# Patient Record
Sex: FEMALE | Race: WHITE | URBAN - METROPOLITAN AREA
[De-identification: names, ages, dates, MRNs, and addresses within clinical notes are randomized per-mention and may not be internally consistent; named-entity substitution may affect disease eponyms.]

---

## 2020-06-01 ENCOUNTER — TELEPHONE ENCOUNTER (OUTPATIENT)
Dept: URBAN - METROPOLITAN AREA CLINIC 78 | Facility: CLINIC | Age: 40
End: 2020-06-01

## 2020-06-01 RX ORDER — MESALAMINE 1.2 G/1
2 TABLETS TABLET, DELAYED RELEASE ORAL ONCE A DAY
Qty: 180 TABLET | Refills: 3 | OUTPATIENT

## 2020-06-16 ENCOUNTER — WEB ENCOUNTER (OUTPATIENT)
Dept: URBAN - METROPOLITAN AREA CLINIC 78 | Facility: CLINIC | Age: 40
End: 2020-06-16

## 2020-06-18 ENCOUNTER — TELEPHONE ENCOUNTER (OUTPATIENT)
Dept: URBAN - METROPOLITAN AREA CLINIC 92 | Facility: CLINIC | Age: 40
End: 2020-06-18

## 2020-06-22 ENCOUNTER — TELEPHONE ENCOUNTER (OUTPATIENT)
Dept: URBAN - METROPOLITAN AREA CLINIC 78 | Facility: CLINIC | Age: 40
End: 2020-06-22

## 2020-06-23 ENCOUNTER — WEB ENCOUNTER (OUTPATIENT)
Dept: URBAN - METROPOLITAN AREA CLINIC 78 | Facility: CLINIC | Age: 40
End: 2020-06-23

## 2020-06-29 ENCOUNTER — WEB ENCOUNTER (OUTPATIENT)
Dept: URBAN - METROPOLITAN AREA CLINIC 78 | Facility: CLINIC | Age: 40
End: 2020-06-29

## 2020-06-29 RX ORDER — MESALAMINE 1.2 G/1
TAKE 4 TABLETS BY MOUTH ONCE DAILY WITH A MEAL TABLET, DELAYED RELEASE ORAL ONCE A DAY
Qty: 360 TABLET | Refills: 3 | OUTPATIENT

## 2020-07-07 ENCOUNTER — CLAIMS CREATED FROM THE CLAIM WINDOW (OUTPATIENT)
Dept: URBAN - METROPOLITAN AREA CLINIC 77 | Facility: CLINIC | Age: 40
End: 2020-07-07
Payer: COMMERCIAL

## 2020-07-07 ENCOUNTER — OFFICE VISIT (OUTPATIENT)
Dept: URBAN - METROPOLITAN AREA CLINIC 77 | Facility: CLINIC | Age: 40
End: 2020-07-07

## 2020-07-07 DIAGNOSIS — K51.80 CHRONIC PANCOLONIC ULCERATIVE COLITIS: ICD-10-CM

## 2020-07-07 PROCEDURE — 96413 CHEMO IV INFUSION 1 HR: CPT | Performed by: INTERNAL MEDICINE

## 2020-07-07 RX ORDER — MESALAMINE 1.2 G/1
2 TABLETS TABLET, DELAYED RELEASE ORAL ONCE A DAY
Qty: 180 TABLET | Refills: 3 | Status: ACTIVE | COMMUNITY

## 2020-07-07 RX ORDER — ESCITALOPRAM OXALATE 5 MG
TAKE 1 TABLET (5 MG) BY ORAL ROUTE ONCE DAILY TABLET ORAL 1
Qty: 0 | Refills: 0 | Status: ACTIVE | COMMUNITY
Start: 1900-01-01 | End: 1900-01-01

## 2020-07-07 RX ORDER — VEDOLIZUMAB 300 MG/5ML
INFUSE 300MG INTRAVENOUSLY OVER 30 MINUTES EVERY 8 WEEKS INJECTION, POWDER, LYOPHILIZED, FOR SOLUTION INTRAVENOUS
Qty: 1 | Refills: 2 | Status: ACTIVE | COMMUNITY
Start: 2019-07-29 | End: 1900-01-01

## 2020-07-07 RX ORDER — MESALAMINE 1.2 G/1
TAKE 4 TABLETS BY MOUTH ONCE DAILY WITH A MEAL TABLET, DELAYED RELEASE ORAL ONCE A DAY
Qty: 360 TABLET | Refills: 3 | Status: ACTIVE | COMMUNITY

## 2020-07-07 RX ORDER — MESALAMINE 4 G/60ML
INSERT 60 MILLILITERS (4 GRAM) BY RECTAL ROUTE ONCE DAILY AT BEDTIME FOR 3 WEEKS ENEMA RECTAL 1
Qty: 21 | Refills: 1 | Status: ACTIVE | COMMUNITY
Start: 1900-01-01 | End: 1900-01-01

## 2020-08-17 ENCOUNTER — TELEPHONE ENCOUNTER (OUTPATIENT)
Dept: URBAN - METROPOLITAN AREA CLINIC 77 | Facility: CLINIC | Age: 40
End: 2020-08-17

## 2020-08-17 RX ORDER — VEDOLIZUMAB 300 MG/5ML
INFUSE 300MG INTRAVENOUSLY OVER 30 MINUTES EVERY 8 WEEKS INJECTION, POWDER, LYOPHILIZED, FOR SOLUTION INTRAVENOUS ONCE A DAY
Qty: 10 | Refills: 1 | OUTPATIENT

## 2020-09-01 ENCOUNTER — OFFICE VISIT (OUTPATIENT)
Dept: URBAN - METROPOLITAN AREA CLINIC 77 | Facility: CLINIC | Age: 40
End: 2020-09-01

## 2020-09-09 ENCOUNTER — OFFICE VISIT (OUTPATIENT)
Dept: URBAN - METROPOLITAN AREA CLINIC 114 | Facility: CLINIC | Age: 40
End: 2020-09-09

## 2020-09-09 ENCOUNTER — CLAIMS CREATED FROM THE CLAIM WINDOW (OUTPATIENT)
Dept: URBAN - METROPOLITAN AREA CLINIC 114 | Facility: CLINIC | Age: 40
End: 2020-09-09
Payer: COMMERCIAL

## 2020-09-09 DIAGNOSIS — K51.80 CHRONIC PANCOLONIC ULCERATIVE COLITIS: ICD-10-CM

## 2020-09-09 PROCEDURE — 96413 CHEMO IV INFUSION 1 HR: CPT | Performed by: INTERNAL MEDICINE

## 2020-09-09 RX ORDER — MESALAMINE 1.2 G/1
2 TABLETS TABLET, DELAYED RELEASE ORAL ONCE A DAY
Qty: 180 TABLET | Refills: 3 | Status: ACTIVE | COMMUNITY

## 2020-09-09 RX ORDER — VEDOLIZUMAB 300 MG/5ML
INFUSE 300MG INTRAVENOUSLY OVER 30 MINUTES EVERY 8 WEEKS INJECTION, POWDER, LYOPHILIZED, FOR SOLUTION INTRAVENOUS ONCE A DAY
Qty: 10 | Refills: 1 | Status: ACTIVE | COMMUNITY

## 2020-09-09 RX ORDER — ESCITALOPRAM OXALATE 5 MG
TAKE 1 TABLET (5 MG) BY ORAL ROUTE ONCE DAILY TABLET ORAL 1
Qty: 0 | Refills: 0 | Status: ACTIVE | COMMUNITY
Start: 1900-01-01 | End: 1900-01-01

## 2020-09-09 RX ORDER — MESALAMINE 4 G/60ML
INSERT 60 MILLILITERS (4 GRAM) BY RECTAL ROUTE ONCE DAILY AT BEDTIME FOR 3 WEEKS ENEMA RECTAL 1
Qty: 21 | Refills: 1 | Status: ACTIVE | COMMUNITY
Start: 1900-01-01 | End: 1900-01-01

## 2020-09-09 RX ORDER — MESALAMINE 1.2 G/1
TAKE 4 TABLETS BY MOUTH ONCE DAILY WITH A MEAL TABLET, DELAYED RELEASE ORAL ONCE A DAY
Qty: 360 TABLET | Refills: 3 | Status: ACTIVE | COMMUNITY

## 2020-10-30 ENCOUNTER — TELEPHONE ENCOUNTER (OUTPATIENT)
Dept: URBAN - METROPOLITAN AREA CLINIC 92 | Facility: CLINIC | Age: 40
End: 2020-10-30

## 2020-11-04 ENCOUNTER — OFFICE VISIT (OUTPATIENT)
Dept: URBAN - METROPOLITAN AREA CLINIC 114 | Facility: CLINIC | Age: 40
End: 2020-11-04
Payer: COMMERCIAL

## 2020-11-04 DIAGNOSIS — K51.80 CHRONIC PANCOLONIC ULCERATIVE COLITIS: ICD-10-CM

## 2020-11-04 PROCEDURE — 96413 CHEMO IV INFUSION 1 HR: CPT | Performed by: INTERNAL MEDICINE

## 2020-11-04 RX ORDER — VEDOLIZUMAB 300 MG/5ML
INFUSE 300MG INTRAVENOUSLY OVER 30 MINUTES EVERY 8 WEEKS INJECTION, POWDER, LYOPHILIZED, FOR SOLUTION INTRAVENOUS ONCE A DAY
Qty: 10 | Refills: 1 | Status: ACTIVE | COMMUNITY

## 2020-11-04 RX ORDER — MESALAMINE 1.2 G/1
TAKE 4 TABLETS BY MOUTH ONCE DAILY WITH A MEAL TABLET, DELAYED RELEASE ORAL ONCE A DAY
Qty: 360 TABLET | Refills: 3 | Status: ACTIVE | COMMUNITY

## 2020-11-04 RX ORDER — MESALAMINE 1.2 G/1
2 TABLETS TABLET, DELAYED RELEASE ORAL ONCE A DAY
Qty: 180 TABLET | Refills: 3 | Status: ACTIVE | COMMUNITY

## 2020-11-04 RX ORDER — MESALAMINE 4 G/60ML
INSERT 60 MILLILITERS (4 GRAM) BY RECTAL ROUTE ONCE DAILY AT BEDTIME FOR 3 WEEKS ENEMA RECTAL 1
Qty: 21 | Refills: 1 | Status: ACTIVE | COMMUNITY
Start: 1900-01-01 | End: 1900-01-01

## 2020-11-04 RX ORDER — ESCITALOPRAM OXALATE 5 MG
TAKE 1 TABLET (5 MG) BY ORAL ROUTE ONCE DAILY TABLET ORAL 1
Qty: 0 | Refills: 0 | Status: ACTIVE | COMMUNITY
Start: 1900-01-01 | End: 1900-01-01

## 2020-11-05 ENCOUNTER — TELEPHONE ENCOUNTER (OUTPATIENT)
Dept: URBAN - METROPOLITAN AREA CLINIC 92 | Facility: CLINIC | Age: 40
End: 2020-11-05

## 2020-11-05 RX ORDER — VEDOLIZUMAB 300 MG/5ML
INFUSE 300MG INTRAVENOUSLY OVER 30 MINUTES EVERY 8 WEEKS INJECTION, POWDER, LYOPHILIZED, FOR SOLUTION INTRAVENOUS ONCE A DAY
Qty: 10 | Refills: 1 | Status: ACTIVE | COMMUNITY

## 2020-11-05 RX ORDER — ESCITALOPRAM OXALATE 5 MG
TAKE 1 TABLET (5 MG) BY ORAL ROUTE ONCE DAILY TABLET ORAL 1
Qty: 0 | Refills: 0 | Status: ACTIVE | COMMUNITY
Start: 1900-01-01 | End: 1900-01-01

## 2020-11-05 RX ORDER — MESALAMINE 4 G/60ML
INSERT 60 MILLILITERS (4 GRAM) BY RECTAL ROUTE ONCE DAILY AT BEDTIME FOR 3 WEEKS ENEMA RECTAL 1
Qty: 21 | Refills: 1 | Status: ACTIVE | COMMUNITY
Start: 1900-01-01 | End: 1900-01-01

## 2020-11-05 RX ORDER — SODIUM PICOSULFATE, MAGNESIUM OXIDE, AND ANHYDROUS CITRIC ACID 10; 3.5; 12 MG/160ML; G/160ML; G/160ML
160 ML DAY #1 AND 160 ML DAY # 2 LIQUID ORAL ONCE A DAY
Qty: 320 MILLIMETER | Refills: 0 | OUTPATIENT
Start: 2020-11-11

## 2020-11-05 RX ORDER — MESALAMINE 1.2 G/1
2 TABLETS TABLET, DELAYED RELEASE ORAL ONCE A DAY
Qty: 180 TABLET | Refills: 3 | Status: ACTIVE | COMMUNITY

## 2020-11-05 RX ORDER — MESALAMINE 1.2 G/1
TAKE 4 TABLETS BY MOUTH ONCE DAILY WITH A MEAL TABLET, DELAYED RELEASE ORAL ONCE A DAY
Qty: 360 TABLET | Refills: 3 | Status: ACTIVE | COMMUNITY

## 2020-11-20 ENCOUNTER — CLAIMS CREATED FROM THE CLAIM WINDOW (OUTPATIENT)
Dept: URBAN - METROPOLITAN AREA CLINIC 4 | Facility: CLINIC | Age: 40
End: 2020-11-20
Payer: COMMERCIAL

## 2020-11-20 ENCOUNTER — OFFICE VISIT (OUTPATIENT)
Dept: URBAN - METROPOLITAN AREA SURGERY CENTER 15 | Facility: SURGERY CENTER | Age: 40
End: 2020-11-20
Payer: COMMERCIAL

## 2020-11-20 DIAGNOSIS — D12.2 ADENOMA OF ASCENDING COLON: ICD-10-CM

## 2020-11-20 DIAGNOSIS — K51.50 CHRONIC LEFT-SIDED ULCERATIVE COLITIS: ICD-10-CM

## 2020-11-20 DIAGNOSIS — D12.2 BENIGN NEOPLASM OF ASCENDING COLON: ICD-10-CM

## 2020-11-20 DIAGNOSIS — K63.89 OTHER SPECIFIED DISEASES OF INTESTINE: ICD-10-CM

## 2020-11-20 PROCEDURE — 45385 COLONOSCOPY W/LESION REMOVAL: CPT | Performed by: INTERNAL MEDICINE

## 2020-11-20 PROCEDURE — 88305 TISSUE EXAM BY PATHOLOGIST: CPT | Performed by: PATHOLOGY

## 2020-11-20 PROCEDURE — 45380 COLONOSCOPY AND BIOPSY: CPT | Performed by: INTERNAL MEDICINE

## 2020-11-20 PROCEDURE — G9937 DIG OR SURV COLSCO: HCPCS | Performed by: INTERNAL MEDICINE

## 2020-11-20 PROCEDURE — G8907 PT DOC NO EVENTS ON DISCHARG: HCPCS | Performed by: INTERNAL MEDICINE

## 2020-11-25 ENCOUNTER — OFFICE VISIT (OUTPATIENT)
Dept: URBAN - METROPOLITAN AREA MEDICAL CENTER 10 | Facility: MEDICAL CENTER | Age: 40
End: 2020-11-25

## 2020-12-08 ENCOUNTER — WEB ENCOUNTER (OUTPATIENT)
Dept: URBAN - METROPOLITAN AREA CLINIC 78 | Facility: CLINIC | Age: 40
End: 2020-12-08

## 2021-01-05 ENCOUNTER — OFFICE VISIT (OUTPATIENT)
Dept: URBAN - METROPOLITAN AREA CLINIC 77 | Facility: CLINIC | Age: 41
End: 2021-01-05
Payer: COMMERCIAL

## 2021-01-05 DIAGNOSIS — K51.80 CHRONIC PANCOLONIC ULCERATIVE COLITIS: ICD-10-CM

## 2021-01-05 PROCEDURE — 96365 THER/PROPH/DIAG IV INF INIT: CPT | Performed by: INTERNAL MEDICINE

## 2021-01-05 RX ORDER — ESCITALOPRAM OXALATE 5 MG
TAKE 1 TABLET (5 MG) BY ORAL ROUTE ONCE DAILY TABLET ORAL 1
Qty: 0 | Refills: 0 | Status: ACTIVE | COMMUNITY
Start: 1900-01-01 | End: 1900-01-01

## 2021-01-05 RX ORDER — SODIUM PICOSULFATE, MAGNESIUM OXIDE, AND ANHYDROUS CITRIC ACID 10; 3.5; 12 MG/160ML; G/160ML; G/160ML
160 ML DAY #1 AND 160 ML DAY # 2 LIQUID ORAL ONCE A DAY
Qty: 320 MILLIMETER | Refills: 0 | Status: ACTIVE | COMMUNITY
Start: 2020-11-11

## 2021-01-05 RX ORDER — MESALAMINE 1.2 G/1
2 TABLETS TABLET, DELAYED RELEASE ORAL ONCE A DAY
Qty: 180 TABLET | Refills: 3 | Status: ACTIVE | COMMUNITY

## 2021-01-05 RX ORDER — MESALAMINE 1.2 G/1
TAKE 4 TABLETS BY MOUTH ONCE DAILY WITH A MEAL TABLET, DELAYED RELEASE ORAL ONCE A DAY
Qty: 360 TABLET | Refills: 3 | Status: ACTIVE | COMMUNITY

## 2021-01-05 RX ORDER — MESALAMINE 4 G/60ML
INSERT 60 MILLILITERS (4 GRAM) BY RECTAL ROUTE ONCE DAILY AT BEDTIME FOR 3 WEEKS ENEMA RECTAL 1
Qty: 21 | Refills: 1 | Status: ACTIVE | COMMUNITY
Start: 1900-01-01 | End: 1900-01-01

## 2021-01-05 RX ORDER — VEDOLIZUMAB 300 MG/5ML
INFUSE 300MG INTRAVENOUSLY OVER 30 MINUTES EVERY 8 WEEKS INJECTION, POWDER, LYOPHILIZED, FOR SOLUTION INTRAVENOUS ONCE A DAY
Qty: 10 | Refills: 1 | Status: ACTIVE | COMMUNITY

## 2021-01-07 ENCOUNTER — WEB ENCOUNTER (OUTPATIENT)
Dept: URBAN - METROPOLITAN AREA CLINIC 78 | Facility: CLINIC | Age: 41
End: 2021-01-07

## 2021-01-07 RX ORDER — MESALAMINE 1.2 G/1
TAKE 4 TABLETS BY MOUTH ONCE DAILY WITH A MEAL TABLET, DELAYED RELEASE ORAL ONCE A DAY
Qty: 360 TABLET | Refills: 3

## 2021-01-08 ENCOUNTER — WEB ENCOUNTER (OUTPATIENT)
Dept: URBAN - METROPOLITAN AREA CLINIC 78 | Facility: CLINIC | Age: 41
End: 2021-01-08

## 2021-01-11 ENCOUNTER — TELEPHONE ENCOUNTER (OUTPATIENT)
Dept: URBAN - METROPOLITAN AREA CLINIC 78 | Facility: CLINIC | Age: 41
End: 2021-01-11

## 2021-01-11 RX ORDER — MESALAMINE 4 G/60ML
60 ML AT BEDTIME ENEMA RECTAL ONCE A DAY
Qty: 5400 ML | Refills: 0

## 2021-01-15 ENCOUNTER — WEB ENCOUNTER (OUTPATIENT)
Dept: URBAN - METROPOLITAN AREA CLINIC 78 | Facility: CLINIC | Age: 41
End: 2021-01-15

## 2021-03-02 ENCOUNTER — OFFICE VISIT (OUTPATIENT)
Dept: URBAN - METROPOLITAN AREA CLINIC 77 | Facility: CLINIC | Age: 41
End: 2021-03-02
Payer: COMMERCIAL

## 2021-03-02 DIAGNOSIS — K51.80 CHRONIC PANCOLONIC ULCERATIVE COLITIS: ICD-10-CM

## 2021-03-02 PROCEDURE — 96365 THER/PROPH/DIAG IV INF INIT: CPT | Performed by: INTERNAL MEDICINE

## 2021-03-02 RX ORDER — MESALAMINE 1.2 G/1
TAKE 4 TABLETS BY MOUTH ONCE DAILY WITH A MEAL TABLET, DELAYED RELEASE ORAL ONCE A DAY
Qty: 360 TABLET | Refills: 3 | Status: ACTIVE | COMMUNITY

## 2021-03-02 RX ORDER — VEDOLIZUMAB 300 MG/5ML
INFUSE 300MG INTRAVENOUSLY OVER 30 MINUTES EVERY 8 WEEKS INJECTION, POWDER, LYOPHILIZED, FOR SOLUTION INTRAVENOUS ONCE A DAY
Qty: 10 | Refills: 1 | Status: ACTIVE | COMMUNITY

## 2021-03-02 RX ORDER — ESCITALOPRAM OXALATE 5 MG
TAKE 1 TABLET (5 MG) BY ORAL ROUTE ONCE DAILY TABLET ORAL 1
Qty: 0 | Refills: 0 | Status: ACTIVE | COMMUNITY
Start: 1900-01-01 | End: 1900-01-01

## 2021-03-02 RX ORDER — SODIUM PICOSULFATE, MAGNESIUM OXIDE, AND ANHYDROUS CITRIC ACID 10; 3.5; 12 MG/160ML; G/160ML; G/160ML
160 ML DAY #1 AND 160 ML DAY # 2 LIQUID ORAL ONCE A DAY
Qty: 320 MILLIMETER | Refills: 0 | Status: ACTIVE | COMMUNITY
Start: 2020-11-11

## 2021-03-02 RX ORDER — MESALAMINE 1.2 G/1
2 TABLETS TABLET, DELAYED RELEASE ORAL ONCE A DAY
Qty: 180 TABLET | Refills: 3 | Status: ACTIVE | COMMUNITY

## 2021-03-02 RX ORDER — MESALAMINE 4 G/60ML
60 ML AT BEDTIME ENEMA RECTAL ONCE A DAY
Qty: 5400 ML | Refills: 0 | Status: ACTIVE | COMMUNITY

## 2021-03-22 ENCOUNTER — OFFICE VISIT (OUTPATIENT)
Dept: URBAN - METROPOLITAN AREA CLINIC 78 | Facility: CLINIC | Age: 41
End: 2021-03-22
Payer: COMMERCIAL

## 2021-03-22 VITALS
WEIGHT: 199.4 LBS | HEIGHT: 67 IN | SYSTOLIC BLOOD PRESSURE: 114 MMHG | HEART RATE: 89 BPM | TEMPERATURE: 97.6 F | BODY MASS INDEX: 31.3 KG/M2 | RESPIRATION RATE: 16 BRPM | DIASTOLIC BLOOD PRESSURE: 76 MMHG

## 2021-03-22 DIAGNOSIS — D12.2 BENIGN NEOPLASM OF ASCENDING COLON: ICD-10-CM

## 2021-03-22 DIAGNOSIS — K51.90 ULCERATIVE COLITIS: ICD-10-CM

## 2021-03-22 PROCEDURE — 99213 OFFICE O/P EST LOW 20 MIN: CPT | Performed by: INTERNAL MEDICINE

## 2021-03-22 RX ORDER — VEDOLIZUMAB 300 MG/5ML
INFUSE 300MG INTRAVENOUSLY OVER 30 MINUTES EVERY 8 WEEKS INJECTION, POWDER, LYOPHILIZED, FOR SOLUTION INTRAVENOUS ONCE A DAY
Qty: 10 | Refills: 1 | Status: ACTIVE | COMMUNITY

## 2021-03-22 RX ORDER — MESALAMINE 4 G/60ML
60 ML AT BEDTIME ENEMA RECTAL ONCE A DAY
Qty: 5400 ML | Refills: 0 | Status: ACTIVE | COMMUNITY

## 2021-03-22 RX ORDER — SODIUM PICOSULFATE, MAGNESIUM OXIDE, AND ANHYDROUS CITRIC ACID 10; 3.5; 12 MG/160ML; G/160ML; G/160ML
160 ML DAY #1 AND 160 ML DAY # 2 LIQUID ORAL ONCE A DAY
Qty: 320 MILLIMETER | Refills: 0 | Status: ON HOLD | COMMUNITY
Start: 2020-11-11

## 2021-03-22 RX ORDER — MESALAMINE 1.2 G/1
TAKE 4 TABLETS BY MOUTH ONCE DAILY WITH A MEAL TABLET, DELAYED RELEASE ORAL ONCE A DAY
Qty: 360 TABLET | Refills: 3 | Status: ACTIVE | COMMUNITY

## 2021-03-22 RX ORDER — MESALAMINE 1.2 G/1
TAKE 4 TABLETS BY MOUTH ONCE DAILY WITH A MEAL TABLET, DELAYED RELEASE ORAL ONCE A DAY
OUTPATIENT

## 2021-03-22 RX ORDER — ESCITALOPRAM OXALATE 5 MG
TAKE 1 TABLET (5 MG) BY ORAL ROUTE ONCE DAILY TABLET ORAL 1
Qty: 0 | Refills: 0 | Status: ACTIVE | COMMUNITY
Start: 1900-01-01

## 2021-03-22 RX ORDER — MESALAMINE 1.2 G/1
2 TABLETS TABLET, DELAYED RELEASE ORAL ONCE A DAY
Qty: 180 TABLET | Refills: 3 | Status: ACTIVE | COMMUNITY

## 2021-03-22 RX ORDER — VEDOLIZUMAB 300 MG/5ML
INFUSE 300MG INTRAVENOUSLY OVER 30 MINUTES EVERY 8 WEEKS INJECTION, POWDER, LYOPHILIZED, FOR SOLUTION INTRAVENOUS ONCE A DAY
OUTPATIENT

## 2021-03-22 NOTE — HPI-TODAY'S VISIT:
Patient is doing well  Personal hx of Proctosigmoiditis  Currently on Entyvio  , last infusion March 2 2021  Mesalamine 4.8 gm /day [facility] : BM 1 a day formed or loose  No blood in stool  No mucus   Occasional gas pressure   Hep B s Ag neg ( immune )   Last Blood with PCP :   Last colonoscopy 11/5/2020     DATA : Does not tolerate oral Uceris or Prednisone ( hyperanxiety)

## 2021-03-30 ENCOUNTER — TELEPHONE ENCOUNTER (OUTPATIENT)
Dept: URBAN - METROPOLITAN AREA CLINIC 78 | Facility: CLINIC | Age: 41
End: 2021-03-30

## 2021-04-22 ENCOUNTER — TELEPHONE ENCOUNTER (OUTPATIENT)
Dept: URBAN - METROPOLITAN AREA CLINIC 78 | Facility: CLINIC | Age: 41
End: 2021-04-22

## 2021-04-23 ENCOUNTER — TELEPHONE ENCOUNTER (OUTPATIENT)
Dept: URBAN - METROPOLITAN AREA CLINIC 78 | Facility: CLINIC | Age: 41
End: 2021-04-23

## 2021-04-26 ENCOUNTER — OFFICE VISIT (OUTPATIENT)
Dept: URBAN - METROPOLITAN AREA CLINIC 77 | Facility: CLINIC | Age: 41
End: 2021-04-26

## 2021-04-26 RX ORDER — ESCITALOPRAM OXALATE 5 MG
TAKE 1 TABLET (5 MG) BY ORAL ROUTE ONCE DAILY TABLET ORAL 1
Qty: 0 | Refills: 0 | Status: ACTIVE | COMMUNITY
Start: 1900-01-01

## 2021-04-26 RX ORDER — VEDOLIZUMAB 300 MG/5ML
INFUSE 300MG INTRAVENOUSLY OVER 30 MINUTES EVERY 8 WEEKS INJECTION, POWDER, LYOPHILIZED, FOR SOLUTION INTRAVENOUS ONCE A DAY
Status: ACTIVE | COMMUNITY

## 2021-04-26 RX ORDER — MESALAMINE 1.2 G/1
TAKE 4 TABLETS BY MOUTH ONCE DAILY WITH A MEAL TABLET, DELAYED RELEASE ORAL ONCE A DAY
Status: ACTIVE | COMMUNITY

## 2021-04-26 RX ORDER — MESALAMINE 1.2 G/1
2 TABLETS TABLET, DELAYED RELEASE ORAL ONCE A DAY
Qty: 180 TABLET | Refills: 3 | Status: ACTIVE | COMMUNITY

## 2021-04-26 RX ORDER — MESALAMINE 4 G/60ML
60 ML AT BEDTIME ENEMA RECTAL ONCE A DAY
Qty: 5400 ML | Refills: 0 | Status: ACTIVE | COMMUNITY

## 2021-04-26 RX ORDER — SODIUM PICOSULFATE, MAGNESIUM OXIDE, AND ANHYDROUS CITRIC ACID 10; 3.5; 12 MG/160ML; G/160ML; G/160ML
160 ML DAY #1 AND 160 ML DAY # 2 LIQUID ORAL ONCE A DAY
Qty: 320 MILLIMETER | Refills: 0 | Status: ON HOLD | COMMUNITY
Start: 2020-11-11

## 2021-05-04 ENCOUNTER — OFFICE VISIT (OUTPATIENT)
Dept: URBAN - METROPOLITAN AREA CLINIC 77 | Facility: CLINIC | Age: 41
End: 2021-05-04
Payer: COMMERCIAL

## 2021-05-04 VITALS
RESPIRATION RATE: 18 BRPM | HEIGHT: 67 IN | BODY MASS INDEX: 31.39 KG/M2 | TEMPERATURE: 97.8 F | WEIGHT: 200 LBS | DIASTOLIC BLOOD PRESSURE: 61 MMHG | HEART RATE: 81 BPM | SYSTOLIC BLOOD PRESSURE: 97 MMHG

## 2021-05-04 DIAGNOSIS — K51.80 CHRONIC PANCOLONIC ULCERATIVE COLITIS: ICD-10-CM

## 2021-05-04 PROCEDURE — 96365 THER/PROPH/DIAG IV INF INIT: CPT | Performed by: INTERNAL MEDICINE

## 2021-05-04 RX ORDER — MESALAMINE 1.2 G/1
2 TABLETS TABLET, DELAYED RELEASE ORAL ONCE A DAY
Qty: 180 TABLET | Refills: 3 | Status: ACTIVE | COMMUNITY

## 2021-05-04 RX ORDER — SODIUM PICOSULFATE, MAGNESIUM OXIDE, AND ANHYDROUS CITRIC ACID 10; 3.5; 12 MG/160ML; G/160ML; G/160ML
160 ML DAY #1 AND 160 ML DAY # 2 LIQUID ORAL ONCE A DAY
Qty: 320 MILLIMETER | Refills: 0 | Status: ON HOLD | COMMUNITY
Start: 2020-11-11

## 2021-05-04 RX ORDER — MESALAMINE 4 G/60ML
60 ML AT BEDTIME ENEMA RECTAL ONCE A DAY
Qty: 5400 ML | Refills: 0 | Status: ACTIVE | COMMUNITY

## 2021-05-04 RX ORDER — ESCITALOPRAM OXALATE 5 MG
TAKE 1 TABLET (5 MG) BY ORAL ROUTE ONCE DAILY TABLET ORAL 1
Qty: 0 | Refills: 0 | Status: ACTIVE | COMMUNITY
Start: 1900-01-01

## 2021-05-04 RX ORDER — MESALAMINE 1.2 G/1
TAKE 4 TABLETS BY MOUTH ONCE DAILY WITH A MEAL TABLET, DELAYED RELEASE ORAL ONCE A DAY
Status: ACTIVE | COMMUNITY

## 2021-05-04 RX ORDER — VEDOLIZUMAB 300 MG/5ML
INFUSE 300MG INTRAVENOUSLY OVER 30 MINUTES EVERY 8 WEEKS INJECTION, POWDER, LYOPHILIZED, FOR SOLUTION INTRAVENOUS ONCE A DAY
Status: ACTIVE | COMMUNITY

## 2021-07-19 ENCOUNTER — OFFICE VISIT (OUTPATIENT)
Dept: URBAN - METROPOLITAN AREA CLINIC 77 | Facility: CLINIC | Age: 41
End: 2021-07-19
Payer: COMMERCIAL

## 2021-07-19 DIAGNOSIS — K51.80 CHRONIC PANCOLONIC ULCERATIVE COLITIS: ICD-10-CM

## 2021-07-19 PROCEDURE — 96365 THER/PROPH/DIAG IV INF INIT: CPT | Performed by: INTERNAL MEDICINE

## 2021-07-19 RX ORDER — MESALAMINE 4 G/60ML
60 ML AT BEDTIME ENEMA RECTAL ONCE A DAY
Qty: 5400 ML | Refills: 0 | Status: ACTIVE | COMMUNITY

## 2021-07-19 RX ORDER — MESALAMINE 1.2 G/1
2 TABLETS TABLET, DELAYED RELEASE ORAL ONCE A DAY
Qty: 180 TABLET | Refills: 3 | Status: ACTIVE | COMMUNITY

## 2021-07-19 RX ORDER — MESALAMINE 1.2 G/1
TAKE 4 TABLETS BY MOUTH ONCE DAILY WITH A MEAL TABLET, DELAYED RELEASE ORAL ONCE A DAY
Status: ACTIVE | COMMUNITY

## 2021-07-19 RX ORDER — VEDOLIZUMAB 300 MG/5ML
INFUSE 300MG INTRAVENOUSLY OVER 30 MINUTES EVERY 8 WEEKS INJECTION, POWDER, LYOPHILIZED, FOR SOLUTION INTRAVENOUS ONCE A DAY
Status: ACTIVE | COMMUNITY

## 2021-07-19 RX ORDER — SODIUM PICOSULFATE, MAGNESIUM OXIDE, AND ANHYDROUS CITRIC ACID 10; 3.5; 12 MG/160ML; G/160ML; G/160ML
160 ML DAY #1 AND 160 ML DAY # 2 LIQUID ORAL ONCE A DAY
Qty: 320 MILLIMETER | Refills: 0 | Status: ON HOLD | COMMUNITY
Start: 2020-11-11

## 2021-07-19 RX ORDER — ESCITALOPRAM OXALATE 5 MG
TAKE 1 TABLET (5 MG) BY ORAL ROUTE ONCE DAILY TABLET ORAL 1
Qty: 0 | Refills: 0 | Status: ACTIVE | COMMUNITY
Start: 1900-01-01

## 2021-09-07 ENCOUNTER — TELEPHONE ENCOUNTER (OUTPATIENT)
Dept: URBAN - METROPOLITAN AREA CLINIC 77 | Facility: CLINIC | Age: 41
End: 2021-09-07

## 2021-09-07 ENCOUNTER — OFFICE VISIT (OUTPATIENT)
Dept: URBAN - METROPOLITAN AREA CLINIC 77 | Facility: CLINIC | Age: 41
End: 2021-09-07
Payer: COMMERCIAL

## 2021-09-07 VITALS
TEMPERATURE: 98.4 F | HEART RATE: 88 BPM | SYSTOLIC BLOOD PRESSURE: 100 MMHG | RESPIRATION RATE: 18 BRPM | BODY MASS INDEX: 29.82 KG/M2 | WEIGHT: 190 LBS | DIASTOLIC BLOOD PRESSURE: 63 MMHG | HEIGHT: 67 IN

## 2021-09-07 DIAGNOSIS — K51.80 CHRONIC PANCOLONIC ULCERATIVE COLITIS: ICD-10-CM

## 2021-09-07 PROCEDURE — 96413 CHEMO IV INFUSION 1 HR: CPT | Performed by: INTERNAL MEDICINE

## 2021-09-07 RX ORDER — MESALAMINE 1.2 G/1
TAKE 4 TABLETS BY MOUTH ONCE DAILY WITH A MEAL TABLET, DELAYED RELEASE ORAL ONCE A DAY
Status: ACTIVE | COMMUNITY

## 2021-09-07 RX ORDER — VEDOLIZUMAB 300 MG/5ML
INFUSE 300MG INTRAVENOUSLY OVER 30 MINUTES EVERY 8 WEEKS INJECTION, POWDER, LYOPHILIZED, FOR SOLUTION INTRAVENOUS ONCE A DAY
Status: ACTIVE | COMMUNITY

## 2021-09-07 RX ORDER — MESALAMINE 1.2 G/1
2 TABLETS TABLET, DELAYED RELEASE ORAL ONCE A DAY
Qty: 180 TABLET | Refills: 3 | Status: ACTIVE | COMMUNITY

## 2021-09-07 RX ORDER — MESALAMINE 4 G/60ML
60 ML AT BEDTIME ENEMA RECTAL ONCE A DAY
Qty: 5400 ML | Refills: 0 | Status: ACTIVE | COMMUNITY

## 2021-09-07 RX ORDER — VEDOLIZUMAB 300 MG/5ML
INFUSE 300MG INTRAVENOUSLY OVER 30 MINUTES EVERY 8 WEEKS INJECTION, POWDER, LYOPHILIZED, FOR SOLUTION INTRAVENOUS ONCE A DAY
Qty: 2 VIAL | Refills: 4

## 2021-09-07 RX ORDER — SODIUM PICOSULFATE, MAGNESIUM OXIDE, AND ANHYDROUS CITRIC ACID 10; 3.5; 12 MG/160ML; G/160ML; G/160ML
160 ML DAY #1 AND 160 ML DAY # 2 LIQUID ORAL ONCE A DAY
Qty: 320 MILLIMETER | Refills: 0 | Status: ON HOLD | COMMUNITY
Start: 2020-11-11

## 2021-09-07 RX ORDER — ESCITALOPRAM OXALATE 5 MG
TAKE 1 TABLET (5 MG) BY ORAL ROUTE ONCE DAILY TABLET ORAL 1
Qty: 0 | Refills: 0 | Status: ACTIVE | COMMUNITY
Start: 1900-01-01

## 2021-09-29 LAB
A/G RATIO: 2
ALBUMIN: 4.3
ALKALINE PHOSPHATASE: 94
ALT (SGPT): 8
AST (SGOT): 12
BASO (ABSOLUTE): 0.1
BASOS: 1
BILIRUBIN, TOTAL: 0.7
BUN/CREATININE RATIO: 13
BUN: 9
CALCIUM: 8.9
CARBON DIOXIDE, TOTAL: 25
CHLORIDE: 102
CREATININE: 0.69
EGFR IF AFRICN AM: 126
EGFR IF NONAFRICN AM: 109
EOS (ABSOLUTE): 0.2
EOS: 3
GLOBULIN, TOTAL: 2.2
GLUCOSE: 87
HEMATOCRIT: 38.4
HEMATOLOGY COMMENTS:: (no result)
HEMOGLOBIN: 12.2
IMMATURE CELLS: (no result)
IMMATURE GRANS (ABS): 0
IMMATURE GRANULOCYTES: 0
LYMPHS (ABSOLUTE): 3.1
LYMPHS: 45
MCH: 30.2
MCHC: 31.8
MCV: 95
MONOCYTES(ABSOLUTE): 0.5
MONOCYTES: 7
NEUTROPHILS (ABSOLUTE): 3
NEUTROPHILS: 44
NRBC: (no result)
PLATELETS: 382
POTASSIUM: 4.4
PROTEIN, TOTAL: 6.5
QUANTIFERON CRITERIA: (no result)
QUANTIFERON INCUBATION: (no result)
QUANTIFERON MITOGEN VALUE: >10
QUANTIFERON NIL VALUE: 0.05
QUANTIFERON TB1 AG VALUE: 0.04
QUANTIFERON TB2 AG VALUE: 0.04
QUANTIFERON-TB GOLD PLUS: NEGATIVE
RBC: 4.04
RDW: 11.8
SODIUM: 139
WBC: 6.9

## 2021-10-08 ENCOUNTER — WEB ENCOUNTER (OUTPATIENT)
Dept: URBAN - METROPOLITAN AREA CLINIC 78 | Facility: CLINIC | Age: 41
End: 2021-10-08

## 2021-10-25 ENCOUNTER — OFFICE VISIT (OUTPATIENT)
Dept: URBAN - METROPOLITAN AREA CLINIC 77 | Facility: CLINIC | Age: 41
End: 2021-10-25
Payer: COMMERCIAL

## 2021-10-25 DIAGNOSIS — K51.80 CHRONIC PANCOLONIC ULCERATIVE COLITIS: ICD-10-CM

## 2021-10-25 PROCEDURE — 96413 CHEMO IV INFUSION 1 HR: CPT | Performed by: INTERNAL MEDICINE

## 2021-10-25 RX ORDER — MESALAMINE 1.2 G/1
2 TABLETS TABLET, DELAYED RELEASE ORAL ONCE A DAY
Qty: 180 TABLET | Refills: 3 | Status: ACTIVE | COMMUNITY

## 2021-10-25 RX ORDER — MESALAMINE 4 G/60ML
60 ML AT BEDTIME ENEMA RECTAL ONCE A DAY
Qty: 5400 ML | Refills: 0 | Status: ACTIVE | COMMUNITY

## 2021-10-25 RX ORDER — SODIUM PICOSULFATE, MAGNESIUM OXIDE, AND ANHYDROUS CITRIC ACID 10; 3.5; 12 MG/160ML; G/160ML; G/160ML
160 ML DAY #1 AND 160 ML DAY # 2 LIQUID ORAL ONCE A DAY
Qty: 320 MILLIMETER | Refills: 0 | Status: ON HOLD | COMMUNITY
Start: 2020-11-11

## 2021-10-25 RX ORDER — VEDOLIZUMAB 300 MG/5ML
INFUSE 300MG INTRAVENOUSLY OVER 30 MINUTES EVERY 8 WEEKS INJECTION, POWDER, LYOPHILIZED, FOR SOLUTION INTRAVENOUS ONCE A DAY
Status: ACTIVE | COMMUNITY

## 2021-10-25 RX ORDER — MESALAMINE 1.2 G/1
TAKE 4 TABLETS BY MOUTH ONCE DAILY WITH A MEAL TABLET, DELAYED RELEASE ORAL ONCE A DAY
Status: ACTIVE | COMMUNITY

## 2021-10-25 RX ORDER — ESCITALOPRAM OXALATE 5 MG
TAKE 1 TABLET (5 MG) BY ORAL ROUTE ONCE DAILY TABLET ORAL 1
Qty: 0 | Refills: 0 | Status: ACTIVE | COMMUNITY
Start: 1900-01-01

## 2021-10-28 ENCOUNTER — TELEPHONE ENCOUNTER (OUTPATIENT)
Dept: URBAN - METROPOLITAN AREA CLINIC 78 | Facility: CLINIC | Age: 41
End: 2021-10-28

## 2021-12-20 ENCOUNTER — OFFICE VISIT (OUTPATIENT)
Dept: URBAN - METROPOLITAN AREA CLINIC 77 | Facility: CLINIC | Age: 41
End: 2021-12-20
Payer: COMMERCIAL

## 2021-12-20 DIAGNOSIS — K51.80 CHRONIC PANCOLONIC ULCERATIVE COLITIS: ICD-10-CM

## 2021-12-20 PROCEDURE — 96413 CHEMO IV INFUSION 1 HR: CPT | Performed by: INTERNAL MEDICINE

## 2021-12-20 RX ORDER — MESALAMINE 4 G/60ML
60 ML AT BEDTIME ENEMA RECTAL ONCE A DAY
Qty: 5400 ML | Refills: 0 | Status: ACTIVE | COMMUNITY

## 2021-12-20 RX ORDER — SODIUM PICOSULFATE, MAGNESIUM OXIDE, AND ANHYDROUS CITRIC ACID 10; 3.5; 12 MG/160ML; G/160ML; G/160ML
160 ML DAY #1 AND 160 ML DAY # 2 LIQUID ORAL ONCE A DAY
Qty: 320 MILLIMETER | Refills: 0 | Status: ON HOLD | COMMUNITY
Start: 2020-11-11

## 2021-12-20 RX ORDER — VEDOLIZUMAB 300 MG/5ML
INFUSE 300MG INTRAVENOUSLY OVER 30 MINUTES EVERY 8 WEEKS INJECTION, POWDER, LYOPHILIZED, FOR SOLUTION INTRAVENOUS ONCE A DAY
Status: ACTIVE | COMMUNITY

## 2021-12-20 RX ORDER — MESALAMINE 1.2 G/1
2 TABLETS TABLET, DELAYED RELEASE ORAL ONCE A DAY
Qty: 180 TABLET | Refills: 3 | Status: ACTIVE | COMMUNITY

## 2021-12-20 RX ORDER — ESCITALOPRAM OXALATE 5 MG
TAKE 1 TABLET (5 MG) BY ORAL ROUTE ONCE DAILY TABLET ORAL 1
Qty: 0 | Refills: 0 | Status: ACTIVE | COMMUNITY
Start: 1900-01-01

## 2021-12-20 RX ORDER — MESALAMINE 1.2 G/1
TAKE 4 TABLETS BY MOUTH ONCE DAILY WITH A MEAL TABLET, DELAYED RELEASE ORAL ONCE A DAY
Status: ACTIVE | COMMUNITY

## 2022-01-11 ENCOUNTER — TELEPHONE ENCOUNTER (OUTPATIENT)
Dept: URBAN - METROPOLITAN AREA CLINIC 78 | Facility: CLINIC | Age: 42
End: 2022-01-11

## 2022-02-15 ENCOUNTER — TELEPHONE ENCOUNTER (OUTPATIENT)
Dept: URBAN - METROPOLITAN AREA CLINIC 78 | Facility: CLINIC | Age: 42
End: 2022-02-15

## 2022-02-15 RX ORDER — MESALAMINE 1.2 G/1
TAKE 4 TABLETS BY MOUTH ONCE DAILY WITH A MEAL TABLET, DELAYED RELEASE ORAL ONCE A DAY
Qty: 180 | Refills: 3

## 2022-03-01 ENCOUNTER — OFFICE VISIT (OUTPATIENT)
Dept: URBAN - METROPOLITAN AREA CLINIC 77 | Facility: CLINIC | Age: 42
End: 2022-03-01
Payer: COMMERCIAL

## 2022-03-01 VITALS
TEMPERATURE: 98.5 F | HEART RATE: 72 BPM | BODY MASS INDEX: 30.45 KG/M2 | SYSTOLIC BLOOD PRESSURE: 116 MMHG | HEIGHT: 67 IN | WEIGHT: 194 LBS | RESPIRATION RATE: 18 BRPM | DIASTOLIC BLOOD PRESSURE: 70 MMHG

## 2022-03-01 DIAGNOSIS — K51.80 CHRONIC PANCOLONIC ULCERATIVE COLITIS: ICD-10-CM

## 2022-03-01 PROCEDURE — 96413 CHEMO IV INFUSION 1 HR: CPT | Performed by: INTERNAL MEDICINE

## 2022-03-01 RX ORDER — MESALAMINE 1.2 G/1
TAKE 4 TABLETS BY MOUTH ONCE DAILY WITH A MEAL TABLET, DELAYED RELEASE ORAL ONCE A DAY
Qty: 180 | Refills: 3 | Status: ACTIVE | COMMUNITY

## 2022-03-01 RX ORDER — SODIUM PICOSULFATE, MAGNESIUM OXIDE, AND ANHYDROUS CITRIC ACID 10; 3.5; 12 MG/160ML; G/160ML; G/160ML
160 ML DAY #1 AND 160 ML DAY # 2 LIQUID ORAL ONCE A DAY
Qty: 320 MILLIMETER | Refills: 0 | Status: ON HOLD | COMMUNITY
Start: 2020-11-11

## 2022-03-01 RX ORDER — MESALAMINE 1.2 G/1
2 TABLETS TABLET, DELAYED RELEASE ORAL ONCE A DAY
Qty: 180 TABLET | Refills: 3 | Status: ACTIVE | COMMUNITY

## 2022-03-01 RX ORDER — ESCITALOPRAM OXALATE 5 MG
TAKE 1 TABLET (5 MG) BY ORAL ROUTE ONCE DAILY TABLET ORAL 1
Qty: 0 | Refills: 0 | Status: ACTIVE | COMMUNITY
Start: 1900-01-01

## 2022-03-01 RX ORDER — MESALAMINE 4 G/60ML
60 ML AT BEDTIME ENEMA RECTAL ONCE A DAY
Qty: 5400 ML | Refills: 0 | Status: ACTIVE | COMMUNITY

## 2022-03-01 RX ORDER — VEDOLIZUMAB 300 MG/5ML
INFUSE 300MG INTRAVENOUSLY OVER 30 MINUTES EVERY 8 WEEKS INJECTION, POWDER, LYOPHILIZED, FOR SOLUTION INTRAVENOUS ONCE A DAY
Status: ACTIVE | COMMUNITY

## 2022-04-26 ENCOUNTER — OFFICE VISIT (OUTPATIENT)
Dept: URBAN - METROPOLITAN AREA CLINIC 77 | Facility: CLINIC | Age: 42
End: 2022-04-26
Payer: COMMERCIAL

## 2022-04-26 VITALS
DIASTOLIC BLOOD PRESSURE: 63 MMHG | TEMPERATURE: 98.4 F | RESPIRATION RATE: 18 BRPM | WEIGHT: 195 LBS | BODY MASS INDEX: 30.61 KG/M2 | HEIGHT: 67 IN | HEART RATE: 74 BPM | SYSTOLIC BLOOD PRESSURE: 96 MMHG

## 2022-04-26 DIAGNOSIS — K51.80 CHRONIC PANCOLONIC ULCERATIVE COLITIS: ICD-10-CM

## 2022-04-26 PROCEDURE — 96413 CHEMO IV INFUSION 1 HR: CPT | Performed by: INTERNAL MEDICINE

## 2022-04-26 RX ORDER — MESALAMINE 4 G/60ML
60 ML AT BEDTIME ENEMA RECTAL ONCE A DAY
Qty: 5400 ML | Refills: 0 | Status: ACTIVE | COMMUNITY

## 2022-04-26 RX ORDER — MESALAMINE 1.2 G/1
TAKE 4 TABLETS BY MOUTH ONCE DAILY WITH A MEAL TABLET, DELAYED RELEASE ORAL ONCE A DAY
Qty: 180 | Refills: 3 | Status: ACTIVE | COMMUNITY

## 2022-04-26 RX ORDER — MESALAMINE 1.2 G/1
2 TABLETS TABLET, DELAYED RELEASE ORAL ONCE A DAY
Qty: 180 TABLET | Refills: 3 | Status: ACTIVE | COMMUNITY

## 2022-04-26 RX ORDER — VEDOLIZUMAB 300 MG/5ML
INFUSE 300MG INTRAVENOUSLY OVER 30 MINUTES EVERY 8 WEEKS INJECTION, POWDER, LYOPHILIZED, FOR SOLUTION INTRAVENOUS ONCE A DAY
Status: ACTIVE | COMMUNITY

## 2022-04-26 RX ORDER — SODIUM PICOSULFATE, MAGNESIUM OXIDE, AND ANHYDROUS CITRIC ACID 10; 3.5; 12 MG/160ML; G/160ML; G/160ML
160 ML DAY #1 AND 160 ML DAY # 2 LIQUID ORAL ONCE A DAY
Qty: 320 MILLIMETER | Refills: 0 | Status: ON HOLD | COMMUNITY
Start: 2020-11-11

## 2022-04-26 RX ORDER — ESCITALOPRAM OXALATE 5 MG
TAKE 1 TABLET (5 MG) BY ORAL ROUTE ONCE DAILY TABLET ORAL 1
Qty: 0 | Refills: 0 | Status: ACTIVE | COMMUNITY
Start: 1900-01-01

## 2022-06-14 ENCOUNTER — OFFICE VISIT (OUTPATIENT)
Dept: URBAN - METROPOLITAN AREA CLINIC 77 | Facility: CLINIC | Age: 42
End: 2022-06-14

## 2022-06-14 RX ORDER — ESCITALOPRAM OXALATE 5 MG
TAKE 1 TABLET (5 MG) BY ORAL ROUTE ONCE DAILY TABLET ORAL 1
Qty: 0 | Refills: 0 | Status: ACTIVE | COMMUNITY
Start: 1900-01-01

## 2022-06-14 RX ORDER — MESALAMINE 1.2 G/1
TAKE 4 TABLETS BY MOUTH ONCE DAILY WITH A MEAL TABLET, DELAYED RELEASE ORAL ONCE A DAY
Qty: 180 | Refills: 3 | Status: ACTIVE | COMMUNITY

## 2022-06-14 RX ORDER — SODIUM PICOSULFATE, MAGNESIUM OXIDE, AND ANHYDROUS CITRIC ACID 10; 3.5; 12 MG/160ML; G/160ML; G/160ML
160 ML DAY #1 AND 160 ML DAY # 2 LIQUID ORAL ONCE A DAY
Qty: 320 MILLIMETER | Refills: 0 | Status: ON HOLD | COMMUNITY
Start: 2020-11-11

## 2022-06-14 RX ORDER — VEDOLIZUMAB 300 MG/5ML
INFUSE 300MG INTRAVENOUSLY OVER 30 MINUTES EVERY 8 WEEKS INJECTION, POWDER, LYOPHILIZED, FOR SOLUTION INTRAVENOUS ONCE A DAY
Status: ACTIVE | COMMUNITY

## 2022-06-14 RX ORDER — MESALAMINE 4 G/60ML
60 ML AT BEDTIME ENEMA RECTAL ONCE A DAY
Qty: 5400 ML | Refills: 0 | Status: ACTIVE | COMMUNITY

## 2022-06-14 RX ORDER — MESALAMINE 1.2 G/1
2 TABLETS TABLET, DELAYED RELEASE ORAL ONCE A DAY
Qty: 180 TABLET | Refills: 3 | Status: ACTIVE | COMMUNITY

## 2022-06-15 ENCOUNTER — TELEPHONE ENCOUNTER (OUTPATIENT)
Dept: URBAN - METROPOLITAN AREA CLINIC 78 | Facility: CLINIC | Age: 42
End: 2022-06-15

## 2022-06-21 ENCOUNTER — OFFICE VISIT (OUTPATIENT)
Dept: URBAN - METROPOLITAN AREA CLINIC 77 | Facility: CLINIC | Age: 42
End: 2022-06-21

## 2022-07-19 ENCOUNTER — WEB ENCOUNTER (OUTPATIENT)
Dept: URBAN - METROPOLITAN AREA CLINIC 78 | Facility: CLINIC | Age: 42
End: 2022-07-19

## 2022-07-19 RX ORDER — MESALAMINE 1.2 G/1
TAKE 4 TABLETS BY MOUTH ONCE DAILY WITH A MEAL TABLET, DELAYED RELEASE ORAL ONCE A DAY
Qty: 360 | Refills: 3

## 2022-07-25 ENCOUNTER — OFFICE VISIT (OUTPATIENT)
Dept: URBAN - METROPOLITAN AREA CLINIC 77 | Facility: CLINIC | Age: 42
End: 2022-07-25
Payer: COMMERCIAL

## 2022-07-25 VITALS
DIASTOLIC BLOOD PRESSURE: 66 MMHG | WEIGHT: 195 LBS | HEIGHT: 67 IN | SYSTOLIC BLOOD PRESSURE: 106 MMHG | BODY MASS INDEX: 30.61 KG/M2

## 2022-07-25 DIAGNOSIS — K51.90 ACUTE ULCERATIVE COLITIS: ICD-10-CM

## 2022-07-25 PROCEDURE — 96413 CHEMO IV INFUSION 1 HR: CPT | Performed by: INTERNAL MEDICINE

## 2022-07-25 RX ORDER — VEDOLIZUMAB 300 MG/5ML
INFUSE 300MG INTRAVENOUSLY OVER 30 MINUTES EVERY 8 WEEKS INJECTION, POWDER, LYOPHILIZED, FOR SOLUTION INTRAVENOUS ONCE A DAY
Status: ACTIVE | COMMUNITY

## 2022-07-25 RX ORDER — ESCITALOPRAM OXALATE 5 MG
TAKE 1 TABLET (5 MG) BY ORAL ROUTE ONCE DAILY TABLET ORAL 1
Qty: 0 | Refills: 0 | Status: ACTIVE | COMMUNITY
Start: 1900-01-01

## 2022-07-25 RX ORDER — SODIUM PICOSULFATE, MAGNESIUM OXIDE, AND ANHYDROUS CITRIC ACID 10; 3.5; 12 MG/160ML; G/160ML; G/160ML
160 ML DAY #1 AND 160 ML DAY # 2 LIQUID ORAL ONCE A DAY
Qty: 320 MILLIMETER | Refills: 0 | Status: ON HOLD | COMMUNITY
Start: 2020-11-11

## 2022-07-25 RX ORDER — MESALAMINE 1.2 G/1
2 TABLETS TABLET, DELAYED RELEASE ORAL ONCE A DAY
Qty: 180 TABLET | Refills: 3 | Status: ACTIVE | COMMUNITY

## 2022-07-25 RX ORDER — MESALAMINE 4 G/60ML
60 ML AT BEDTIME ENEMA RECTAL ONCE A DAY
Qty: 5400 ML | Refills: 0 | Status: ACTIVE | COMMUNITY

## 2022-07-25 RX ORDER — MESALAMINE 1.2 G/1
TAKE 4 TABLETS BY MOUTH ONCE DAILY WITH A MEAL TABLET, DELAYED RELEASE ORAL ONCE A DAY
Qty: 360 | Refills: 3 | Status: ACTIVE | COMMUNITY

## 2022-08-16 ENCOUNTER — OFFICE VISIT (OUTPATIENT)
Dept: URBAN - METROPOLITAN AREA CLINIC 77 | Facility: CLINIC | Age: 42
End: 2022-08-16

## 2022-09-19 ENCOUNTER — OFFICE VISIT (OUTPATIENT)
Dept: URBAN - METROPOLITAN AREA CLINIC 77 | Facility: CLINIC | Age: 42
End: 2022-09-19
Payer: COMMERCIAL

## 2022-09-19 ENCOUNTER — TELEPHONE ENCOUNTER (OUTPATIENT)
Dept: URBAN - METROPOLITAN AREA CLINIC 77 | Facility: CLINIC | Age: 42
End: 2022-09-19

## 2022-09-19 VITALS
HEIGHT: 67 IN | WEIGHT: 195 LBS | BODY MASS INDEX: 30.61 KG/M2 | DIASTOLIC BLOOD PRESSURE: 65 MMHG | SYSTOLIC BLOOD PRESSURE: 103 MMHG

## 2022-09-19 DIAGNOSIS — K51.90 ACUTE ULCERATIVE COLITIS: ICD-10-CM

## 2022-09-19 PROCEDURE — 96413 CHEMO IV INFUSION 1 HR: CPT | Performed by: INTERNAL MEDICINE

## 2022-09-19 RX ORDER — MESALAMINE 4 G/60ML
60 ML AT BEDTIME ENEMA RECTAL ONCE A DAY
Qty: 5400 ML | Refills: 0 | Status: ACTIVE | COMMUNITY

## 2022-09-19 RX ORDER — MESALAMINE 1.2 G/1
2 TABLETS TABLET, DELAYED RELEASE ORAL ONCE A DAY
Qty: 180 TABLET | Refills: 3 | Status: ACTIVE | COMMUNITY

## 2022-09-19 RX ORDER — VEDOLIZUMAB 300 MG/5ML
INFUSE 300MG INTRAVENOUSLY OVER 30 MINUTES EVERY 8 WEEKS INJECTION, POWDER, LYOPHILIZED, FOR SOLUTION INTRAVENOUS ONCE A DAY
Status: ACTIVE | COMMUNITY

## 2022-09-19 RX ORDER — MESALAMINE 1.2 G/1
TAKE 4 TABLETS BY MOUTH ONCE DAILY WITH A MEAL TABLET, DELAYED RELEASE ORAL ONCE A DAY
Qty: 360 | Refills: 3 | Status: ACTIVE | COMMUNITY

## 2022-09-19 RX ORDER — ESCITALOPRAM OXALATE 5 MG
TAKE 1 TABLET (5 MG) BY ORAL ROUTE ONCE DAILY TABLET ORAL 1
Qty: 0 | Refills: 0 | Status: ACTIVE | COMMUNITY
Start: 1900-01-01

## 2022-09-19 RX ORDER — SODIUM PICOSULFATE, MAGNESIUM OXIDE, AND ANHYDROUS CITRIC ACID 10; 3.5; 12 MG/160ML; G/160ML; G/160ML
160 ML DAY #1 AND 160 ML DAY # 2 LIQUID ORAL ONCE A DAY
Qty: 320 MILLIMETER | Refills: 0 | Status: ON HOLD | COMMUNITY
Start: 2020-11-11

## 2022-09-19 RX ORDER — VEDOLIZUMAB 300 MG/5ML
INFUSE 300 MG OVER 30 MTS AT WEEK 0 , WEEK 2 AND WEEK 6 AND THEN Q 8 WEEKS INJECTION, POWDER, LYOPHILIZED, FOR SOLUTION INTRAVENOUS ONCE A DAY
Qty: 2 | Refills: 4

## 2022-09-21 ENCOUNTER — OFFICE VISIT (OUTPATIENT)
Dept: URBAN - METROPOLITAN AREA CLINIC 78 | Facility: CLINIC | Age: 42
End: 2022-09-21
Payer: COMMERCIAL

## 2022-09-21 VITALS
RESPIRATION RATE: 16 BRPM | SYSTOLIC BLOOD PRESSURE: 107 MMHG | DIASTOLIC BLOOD PRESSURE: 74 MMHG | WEIGHT: 195.2 LBS | TEMPERATURE: 98.1 F | BODY MASS INDEX: 30.64 KG/M2 | HEIGHT: 67 IN

## 2022-09-21 DIAGNOSIS — K51.90 ULCERATIVE COLITIS: ICD-10-CM

## 2022-09-21 DIAGNOSIS — Z86.010 PERSONAL HISTORY OF COLONIC POLYPS: ICD-10-CM

## 2022-09-21 PROCEDURE — 99214 OFFICE O/P EST MOD 30 MIN: CPT | Performed by: INTERNAL MEDICINE

## 2022-09-21 RX ORDER — VEDOLIZUMAB 300 MG/5ML
INFUSE 300 MG OVER 30 MTS AT WEEK 0 , WEEK 2 AND WEEK 6 AND THEN Q 8 WEEKS INJECTION, POWDER, LYOPHILIZED, FOR SOLUTION INTRAVENOUS ONCE A DAY
Qty: 2 | Refills: 4 | Status: ACTIVE | COMMUNITY

## 2022-09-21 RX ORDER — MESALAMINE 1.2 G/1
TAKE 4 TABLETS BY MOUTH ONCE DAILY WITH A MEAL TABLET, DELAYED RELEASE ORAL ONCE A DAY
Qty: 360 | Refills: 3 | Status: ACTIVE | COMMUNITY

## 2022-09-21 RX ORDER — ESCITALOPRAM OXALATE 5 MG
TAKE 1 TABLET (5 MG) BY ORAL ROUTE ONCE DAILY TABLET ORAL 1
Qty: 0 | Refills: 0 | Status: ACTIVE | COMMUNITY
Start: 1900-01-01

## 2022-09-21 RX ORDER — MESALAMINE 4 G/60ML
60 ML AT BEDTIME ENEMA RECTAL ONCE A DAY
Qty: 5400 ML | Refills: 0 | Status: ACTIVE | COMMUNITY

## 2022-09-21 RX ORDER — VEDOLIZUMAB 300 MG/5ML
AS DIRECTED INJECTION, POWDER, LYOPHILIZED, FOR SOLUTION INTRAVENOUS
Qty: 10 | Refills: 0 | OUTPATIENT
Start: 2022-09-21 | End: 2022-12-23

## 2022-09-21 RX ORDER — SODIUM PICOSULFATE, MAGNESIUM OXIDE, AND ANHYDROUS CITRIC ACID 10; 3.5; 12 MG/160ML; G/160ML; G/160ML
160 ML DAY #1 AND 160 ML DAY # 2 LIQUID ORAL ONCE A DAY
Qty: 320 MILLIMETER | Refills: 0 | Status: ON HOLD | COMMUNITY
Start: 2020-11-11

## 2022-09-21 RX ORDER — MESALAMINE 1.2 G/1
2 TABLETS TABLET, DELAYED RELEASE ORAL ONCE A DAY
Qty: 180 TABLET | Refills: 3 | Status: ACTIVE | COMMUNITY

## 2022-09-21 RX ORDER — SODIUM, POTASSIUM,MAG SULFATES 17.5-3.13G
177 ML DAY 1 AND 177 ML DAY 2 SOLUTION, RECONSTITUTED, ORAL ORAL
Qty: 354 MILLILITER | OUTPATIENT

## 2022-09-21 NOTE — HPI-TODAY'S VISIT:
Patient is doing well  Personal hx of Proctosigmoiditis  Dx in 2005  Currently on Entyvio   Mesalamine 4.8 gm /day [facility] : BM 1 a day formed or loose  No blood in stool  No mucus  Flatulence     Hep B s Ag neg ( immune )      Last colonoscopy 11/5/2020     DATA : Does not tolerate oral Uceris or Prednisone ( hyperanxiety)

## 2022-09-26 PROBLEM — 428283002: Status: ACTIVE | Noted: 2022-09-21

## 2022-10-14 ENCOUNTER — TELEPHONE ENCOUNTER (OUTPATIENT)
Dept: URBAN - METROPOLITAN AREA CLINIC 78 | Facility: CLINIC | Age: 42
End: 2022-10-14

## 2022-10-17 ENCOUNTER — WEB ENCOUNTER (OUTPATIENT)
Dept: URBAN - METROPOLITAN AREA CLINIC 78 | Facility: CLINIC | Age: 42
End: 2022-10-17

## 2022-10-19 ENCOUNTER — WEB ENCOUNTER (OUTPATIENT)
Dept: URBAN - METROPOLITAN AREA CLINIC 78 | Facility: CLINIC | Age: 42
End: 2022-10-19

## 2022-10-26 ENCOUNTER — WEB ENCOUNTER (OUTPATIENT)
Dept: URBAN - METROPOLITAN AREA CLINIC 78 | Facility: CLINIC | Age: 42
End: 2022-10-26

## 2022-11-07 ENCOUNTER — TELEPHONE ENCOUNTER (OUTPATIENT)
Dept: URBAN - METROPOLITAN AREA CLINIC 78 | Facility: CLINIC | Age: 42
End: 2022-11-07

## 2022-11-08 ENCOUNTER — OFFICE VISIT (OUTPATIENT)
Dept: URBAN - METROPOLITAN AREA SURGERY CENTER 15 | Facility: SURGERY CENTER | Age: 42
End: 2022-11-08
Payer: COMMERCIAL

## 2022-11-08 ENCOUNTER — CLAIMS CREATED FROM THE CLAIM WINDOW (OUTPATIENT)
Dept: URBAN - METROPOLITAN AREA CLINIC 4 | Facility: CLINIC | Age: 42
End: 2022-11-08
Payer: COMMERCIAL

## 2022-11-08 DIAGNOSIS — K63.89 OTHER SPECIFIED DISEASES OF INTESTINE: ICD-10-CM

## 2022-11-08 DIAGNOSIS — K51.50 CHRONIC LEFT-SIDED ULCERATIVE COLITIS: ICD-10-CM

## 2022-11-08 PROCEDURE — G8907 PT DOC NO EVENTS ON DISCHARG: HCPCS | Performed by: INTERNAL MEDICINE

## 2022-11-08 PROCEDURE — 88305 TISSUE EXAM BY PATHOLOGIST: CPT | Performed by: PATHOLOGY

## 2022-11-08 PROCEDURE — 45380 COLONOSCOPY AND BIOPSY: CPT | Performed by: INTERNAL MEDICINE

## 2022-11-14 ENCOUNTER — OFFICE VISIT (OUTPATIENT)
Dept: URBAN - METROPOLITAN AREA CLINIC 77 | Facility: CLINIC | Age: 42
End: 2022-11-14
Payer: COMMERCIAL

## 2022-11-14 VITALS
DIASTOLIC BLOOD PRESSURE: 63 MMHG | WEIGHT: 194 LBS | HEIGHT: 67 IN | BODY MASS INDEX: 30.45 KG/M2 | SYSTOLIC BLOOD PRESSURE: 98 MMHG

## 2022-11-14 DIAGNOSIS — K51.80 CHRONIC PANCOLONIC ULCERATIVE COLITIS: ICD-10-CM

## 2022-11-14 PROCEDURE — 96413 CHEMO IV INFUSION 1 HR: CPT | Performed by: INTERNAL MEDICINE

## 2022-11-14 RX ORDER — VEDOLIZUMAB 300 MG/5ML
AS DIRECTED INJECTION, POWDER, LYOPHILIZED, FOR SOLUTION INTRAVENOUS
Qty: 10 | Refills: 0 | Status: ACTIVE | COMMUNITY
Start: 2022-09-21 | End: 2022-12-23

## 2022-11-14 RX ORDER — SODIUM, POTASSIUM,MAG SULFATES 17.5-3.13G
177 ML DAY 1 AND 177 ML DAY 2 SOLUTION, RECONSTITUTED, ORAL ORAL
Qty: 354 MILLILITER | Status: ACTIVE | COMMUNITY

## 2022-11-14 RX ORDER — VEDOLIZUMAB 300 MG/5ML
INFUSE 300 MG OVER 30 MTS AT WEEK 0 , WEEK 2 AND WEEK 6 AND THEN Q 8 WEEKS INJECTION, POWDER, LYOPHILIZED, FOR SOLUTION INTRAVENOUS ONCE A DAY
Qty: 2 | Refills: 4 | Status: ACTIVE | COMMUNITY

## 2022-11-14 RX ORDER — ESCITALOPRAM OXALATE 5 MG
TAKE 1 TABLET (5 MG) BY ORAL ROUTE ONCE DAILY TABLET ORAL 1
Qty: 0 | Refills: 0 | Status: ACTIVE | COMMUNITY
Start: 1900-01-01

## 2022-11-14 RX ORDER — MESALAMINE 4 G/60ML
60 ML AT BEDTIME ENEMA RECTAL ONCE A DAY
Qty: 5400 ML | Refills: 0 | Status: ACTIVE | COMMUNITY

## 2022-11-14 RX ORDER — MESALAMINE 1.2 G/1
2 TABLETS TABLET, DELAYED RELEASE ORAL ONCE A DAY
Qty: 180 TABLET | Refills: 3 | Status: ACTIVE | COMMUNITY

## 2022-11-14 RX ORDER — MESALAMINE 1.2 G/1
TAKE 4 TABLETS BY MOUTH ONCE DAILY WITH A MEAL TABLET, DELAYED RELEASE ORAL ONCE A DAY
Qty: 360 | Refills: 3 | Status: ACTIVE | COMMUNITY

## 2022-11-14 RX ORDER — SODIUM PICOSULFATE, MAGNESIUM OXIDE, AND ANHYDROUS CITRIC ACID 10; 3.5; 12 MG/160ML; G/160ML; G/160ML
160 ML DAY #1 AND 160 ML DAY # 2 LIQUID ORAL ONCE A DAY
Qty: 320 MILLIMETER | Refills: 0 | Status: ON HOLD | COMMUNITY
Start: 2020-11-11

## 2022-12-12 ENCOUNTER — OFFICE VISIT (OUTPATIENT)
Dept: URBAN - METROPOLITAN AREA CLINIC 78 | Facility: CLINIC | Age: 42
End: 2022-12-12
Payer: COMMERCIAL

## 2022-12-12 VITALS
HEART RATE: 82 BPM | WEIGHT: 195 LBS | HEIGHT: 67 IN | BODY MASS INDEX: 30.61 KG/M2 | DIASTOLIC BLOOD PRESSURE: 73 MMHG | SYSTOLIC BLOOD PRESSURE: 107 MMHG | TEMPERATURE: 97.9 F

## 2022-12-12 DIAGNOSIS — E87.5 HYPERKALEMIA: ICD-10-CM

## 2022-12-12 DIAGNOSIS — K51.90 ULCERATIVE COLITIS: ICD-10-CM

## 2022-12-12 PROCEDURE — 99213 OFFICE O/P EST LOW 20 MIN: CPT | Performed by: INTERNAL MEDICINE

## 2022-12-12 RX ORDER — MESALAMINE 4 G/60ML
60 ML AT BEDTIME ENEMA RECTAL ONCE A DAY
Qty: 5400 ML | Refills: 0 | Status: ACTIVE | COMMUNITY

## 2022-12-12 RX ORDER — VEDOLIZUMAB 300 MG/5ML
AS DIRECTED INJECTION, POWDER, LYOPHILIZED, FOR SOLUTION INTRAVENOUS
Qty: 10 | Refills: 0 | Status: ACTIVE | COMMUNITY
Start: 2022-09-21 | End: 2022-12-23

## 2022-12-12 RX ORDER — SODIUM PICOSULFATE, MAGNESIUM OXIDE, AND ANHYDROUS CITRIC ACID 10; 3.5; 12 MG/160ML; G/160ML; G/160ML
160 ML DAY #1 AND 160 ML DAY # 2 LIQUID ORAL ONCE A DAY
Qty: 320 MILLIMETER | Refills: 0 | Status: ON HOLD | COMMUNITY
Start: 2020-11-11

## 2022-12-12 RX ORDER — VEDOLIZUMAB 300 MG/5ML
AS DIRECTED INJECTION, POWDER, LYOPHILIZED, FOR SOLUTION INTRAVENOUS
Qty: 10 | Refills: 0 | OUTPATIENT

## 2022-12-12 RX ORDER — VEDOLIZUMAB 300 MG/5ML
INFUSE 300 MG OVER 30 MTS AT WEEK 0 , WEEK 2 AND WEEK 6 AND THEN Q 8 WEEKS INJECTION, POWDER, LYOPHILIZED, FOR SOLUTION INTRAVENOUS ONCE A DAY
Qty: 2 | Refills: 4 | Status: ACTIVE | COMMUNITY

## 2022-12-12 RX ORDER — MESALAMINE 1.2 G/1
2 TABLETS TABLET, DELAYED RELEASE ORAL ONCE A DAY
Qty: 180 TABLET | Refills: 3 | Status: ACTIVE | COMMUNITY

## 2022-12-12 RX ORDER — ESCITALOPRAM OXALATE 5 MG
TAKE 1 TABLET (5 MG) BY ORAL ROUTE ONCE DAILY TABLET ORAL 1
Qty: 0 | Refills: 0 | Status: ACTIVE | COMMUNITY
Start: 1900-01-01

## 2022-12-12 RX ORDER — SODIUM, POTASSIUM,MAG SULFATES 17.5-3.13G
177 ML DAY 1 AND 177 ML DAY 2 SOLUTION, RECONSTITUTED, ORAL ORAL
Qty: 354 MILLILITER | Status: ACTIVE | COMMUNITY

## 2022-12-12 RX ORDER — MESALAMINE 1.2 G/1
TAKE 4 TABLETS BY MOUTH ONCE DAILY WITH A MEAL TABLET, DELAYED RELEASE ORAL ONCE A DAY
Qty: 360 | Refills: 3 | Status: ACTIVE | COMMUNITY

## 2022-12-12 NOTE — HPI-TODAY'S VISIT:
Patient is doing well I reviewed the colonoscopy findings and prep with patient . Reviewed the path removed including path results : No colitis  All questions answered  to satisfaction   Recently she took abx for sinus infection Personal hx of Proctosigmoiditis  Dx in 2005  Currently on Entyvio   Mesalamine 4.8 gm /day [facility] : BM 1 a day formed or loose  No blood in stool  No mucus  Flatulence     Hep B s Ag neg ( immune )     Last colonoscopy 11/8/22 : Normal exam and  Last colonoscopy 11/5/2020      DATA : Does not tolerate oral Uceris or Prednisone ( hyperanxiety)

## 2022-12-23 LAB
MITOGEN-NIL: >10
NIL: 0.08
QUANTIFERON(R)-TB GOLD: NEGATIVE
TB1-NIL: <0
TB2-NIL: <0

## 2023-02-06 ENCOUNTER — OFFICE VISIT (OUTPATIENT)
Dept: URBAN - METROPOLITAN AREA CLINIC 114 | Facility: CLINIC | Age: 43
End: 2023-02-06
Payer: COMMERCIAL

## 2023-02-06 VITALS
DIASTOLIC BLOOD PRESSURE: 74 MMHG | WEIGHT: 198.4 LBS | SYSTOLIC BLOOD PRESSURE: 124 MMHG | HEIGHT: 67 IN | HEART RATE: 79 BPM | TEMPERATURE: 98.4 F | RESPIRATION RATE: 20 BRPM | BODY MASS INDEX: 31.14 KG/M2

## 2023-02-06 DIAGNOSIS — K51.80 OTHER ULCERATIVE COLITIS WITHOUT COMPLICATION: ICD-10-CM

## 2023-02-06 PROCEDURE — 96413 CHEMO IV INFUSION 1 HR: CPT | Performed by: INTERNAL MEDICINE

## 2023-02-06 RX ORDER — MESALAMINE 4 G/60ML
60 ML AT BEDTIME ENEMA RECTAL ONCE A DAY
Qty: 5400 ML | Refills: 0 | Status: ACTIVE | COMMUNITY

## 2023-02-06 RX ORDER — SODIUM, POTASSIUM,MAG SULFATES 17.5-3.13G
177 ML DAY 1 AND 177 ML DAY 2 SOLUTION, RECONSTITUTED, ORAL ORAL
Qty: 354 MILLILITER | Status: ACTIVE | COMMUNITY

## 2023-02-06 RX ORDER — MESALAMINE 1.2 G/1
TAKE 4 TABLETS BY MOUTH ONCE DAILY WITH A MEAL TABLET, DELAYED RELEASE ORAL ONCE A DAY
Qty: 360 | Refills: 3 | Status: ACTIVE | COMMUNITY

## 2023-02-06 RX ORDER — MESALAMINE 1.2 G/1
2 TABLETS TABLET, DELAYED RELEASE ORAL ONCE A DAY
Qty: 180 TABLET | Refills: 3 | Status: ACTIVE | COMMUNITY

## 2023-02-06 RX ORDER — SODIUM PICOSULFATE, MAGNESIUM OXIDE, AND ANHYDROUS CITRIC ACID 10; 3.5; 12 MG/160ML; G/160ML; G/160ML
160 ML DAY #1 AND 160 ML DAY # 2 LIQUID ORAL ONCE A DAY
Qty: 320 MILLIMETER | Refills: 0 | Status: ON HOLD | COMMUNITY
Start: 2020-11-11

## 2023-02-06 RX ORDER — VEDOLIZUMAB 300 MG/5ML
INFUSE 300 MG OVER 30 MTS AT WEEK 0 , WEEK 2 AND WEEK 6 AND THEN Q 8 WEEKS INJECTION, POWDER, LYOPHILIZED, FOR SOLUTION INTRAVENOUS ONCE A DAY
Qty: 2 | Refills: 4 | Status: ACTIVE | COMMUNITY

## 2023-02-06 RX ORDER — ESCITALOPRAM OXALATE 5 MG
TAKE 1 TABLET (5 MG) BY ORAL ROUTE ONCE DAILY TABLET ORAL 1
Qty: 0 | Refills: 0 | Status: ACTIVE | COMMUNITY
Start: 1900-01-01

## 2023-02-06 RX ORDER — VEDOLIZUMAB 300 MG/5ML
AS DIRECTED INJECTION, POWDER, LYOPHILIZED, FOR SOLUTION INTRAVENOUS
Qty: 10 | Refills: 0 | Status: ACTIVE | COMMUNITY

## 2023-02-07 PROBLEM — 64766004 ULCERATIVE COLITIS: Status: ACTIVE | Noted: 2022-06-28

## 2023-04-10 ENCOUNTER — CLAIMS CREATED FROM THE CLAIM WINDOW (OUTPATIENT)
Dept: URBAN - METROPOLITAN AREA CLINIC 77 | Facility: CLINIC | Age: 43
End: 2023-04-10
Payer: COMMERCIAL

## 2023-04-10 VITALS
HEIGHT: 67 IN | SYSTOLIC BLOOD PRESSURE: 93 MMHG | TEMPERATURE: 98.4 F | BODY MASS INDEX: 31.39 KG/M2 | WEIGHT: 200 LBS | DIASTOLIC BLOOD PRESSURE: 59 MMHG

## 2023-04-10 DIAGNOSIS — K51.80 CHRONIC PANCOLONIC ULCERATIVE COLITIS: ICD-10-CM

## 2023-04-10 PROCEDURE — 96413 CHEMO IV INFUSION 1 HR: CPT | Performed by: INTERNAL MEDICINE

## 2023-04-10 RX ORDER — SODIUM, POTASSIUM,MAG SULFATES 17.5-3.13G
177 ML DAY 1 AND 177 ML DAY 2 SOLUTION, RECONSTITUTED, ORAL ORAL
Qty: 354 MILLILITER | Status: ACTIVE | COMMUNITY

## 2023-04-10 RX ORDER — ESCITALOPRAM OXALATE 5 MG
TAKE 1 TABLET (5 MG) BY ORAL ROUTE ONCE DAILY TABLET ORAL 1
Qty: 0 | Refills: 0 | Status: ACTIVE | COMMUNITY
Start: 1900-01-01

## 2023-04-10 RX ORDER — MESALAMINE 1.2 G/1
TAKE 4 TABLETS BY MOUTH ONCE DAILY WITH A MEAL TABLET, DELAYED RELEASE ORAL ONCE A DAY
Qty: 360 | Refills: 3 | Status: ACTIVE | COMMUNITY

## 2023-04-10 RX ORDER — VEDOLIZUMAB 300 MG/5ML
AS DIRECTED INJECTION, POWDER, LYOPHILIZED, FOR SOLUTION INTRAVENOUS
Qty: 10 | Refills: 0 | Status: ACTIVE | COMMUNITY

## 2023-04-10 RX ORDER — VEDOLIZUMAB 300 MG/5ML
INFUSE 300 MG OVER 30 MTS AT WEEK 0 , WEEK 2 AND WEEK 6 AND THEN Q 8 WEEKS INJECTION, POWDER, LYOPHILIZED, FOR SOLUTION INTRAVENOUS ONCE A DAY
Qty: 2 | Refills: 4 | Status: ACTIVE | COMMUNITY

## 2023-04-10 RX ORDER — MESALAMINE 4 G/60ML
60 ML AT BEDTIME ENEMA RECTAL ONCE A DAY
Qty: 5400 ML | Refills: 0 | Status: ACTIVE | COMMUNITY

## 2023-04-10 RX ORDER — MESALAMINE 1.2 G/1
2 TABLETS TABLET, DELAYED RELEASE ORAL ONCE A DAY
Qty: 180 TABLET | Refills: 3 | Status: ACTIVE | COMMUNITY

## 2023-04-10 RX ORDER — SODIUM PICOSULFATE, MAGNESIUM OXIDE, AND ANHYDROUS CITRIC ACID 10; 3.5; 12 MG/160ML; G/160ML; G/160ML
160 ML DAY #1 AND 160 ML DAY # 2 LIQUID ORAL ONCE A DAY
Qty: 320 MILLIMETER | Refills: 0 | Status: ON HOLD | COMMUNITY
Start: 2020-11-11

## 2023-06-12 ENCOUNTER — OFFICE VISIT (OUTPATIENT)
Dept: URBAN - METROPOLITAN AREA CLINIC 78 | Facility: CLINIC | Age: 43
End: 2023-06-12

## 2023-06-27 ENCOUNTER — OFFICE VISIT (OUTPATIENT)
Dept: URBAN - METROPOLITAN AREA CLINIC 114 | Facility: CLINIC | Age: 43
End: 2023-06-27

## 2023-06-30 ENCOUNTER — OFFICE VISIT (OUTPATIENT)
Dept: URBAN - METROPOLITAN AREA CLINIC 97 | Facility: CLINIC | Age: 43
End: 2023-06-30
Payer: COMMERCIAL

## 2023-06-30 VITALS
DIASTOLIC BLOOD PRESSURE: 70 MMHG | HEART RATE: 90 BPM | SYSTOLIC BLOOD PRESSURE: 108 MMHG | RESPIRATION RATE: 18 BRPM | TEMPERATURE: 97.5 F | HEIGHT: 67 IN | BODY MASS INDEX: 31.71 KG/M2 | WEIGHT: 202 LBS

## 2023-06-30 DIAGNOSIS — K51.80 CHRONIC PANCOLONIC ULCERATIVE COLITIS: ICD-10-CM

## 2023-06-30 PROCEDURE — 96413 CHEMO IV INFUSION 1 HR: CPT | Performed by: INTERNAL MEDICINE

## 2023-06-30 RX ORDER — SODIUM, POTASSIUM,MAG SULFATES 17.5-3.13G
177 ML DAY 1 AND 177 ML DAY 2 SOLUTION, RECONSTITUTED, ORAL ORAL
Qty: 354 MILLILITER | Status: ACTIVE | COMMUNITY

## 2023-06-30 RX ORDER — SODIUM PICOSULFATE, MAGNESIUM OXIDE, AND ANHYDROUS CITRIC ACID 10; 3.5; 12 MG/160ML; G/160ML; G/160ML
160 ML DAY #1 AND 160 ML DAY # 2 LIQUID ORAL ONCE A DAY
Qty: 320 MILLIMETER | Refills: 0 | Status: ON HOLD | COMMUNITY
Start: 2020-11-11

## 2023-06-30 RX ORDER — VEDOLIZUMAB 300 MG/5ML
INFUSE 300 MG OVER 30 MTS AT WEEK 0 , WEEK 2 AND WEEK 6 AND THEN Q 8 WEEKS INJECTION, POWDER, LYOPHILIZED, FOR SOLUTION INTRAVENOUS ONCE A DAY
Qty: 2 | Refills: 4 | Status: ACTIVE | COMMUNITY

## 2023-06-30 RX ORDER — VEDOLIZUMAB 300 MG/5ML
AS DIRECTED INJECTION, POWDER, LYOPHILIZED, FOR SOLUTION INTRAVENOUS
Qty: 10 | Refills: 0 | Status: ACTIVE | COMMUNITY

## 2023-06-30 RX ORDER — MESALAMINE 1.2 G/1
2 TABLETS TABLET, DELAYED RELEASE ORAL ONCE A DAY
Qty: 180 TABLET | Refills: 3 | Status: ACTIVE | COMMUNITY

## 2023-06-30 RX ORDER — ESCITALOPRAM OXALATE 5 MG
TAKE 1 TABLET (5 MG) BY ORAL ROUTE ONCE DAILY TABLET ORAL 1
Qty: 0 | Refills: 0 | Status: ACTIVE | COMMUNITY
Start: 1900-01-01

## 2023-06-30 RX ORDER — MESALAMINE 1.2 G/1
TAKE 4 TABLETS BY MOUTH ONCE DAILY WITH A MEAL TABLET, DELAYED RELEASE ORAL ONCE A DAY
Qty: 360 | Refills: 3 | Status: ACTIVE | COMMUNITY

## 2023-06-30 RX ORDER — MESALAMINE 4 G/60ML
60 ML AT BEDTIME ENEMA RECTAL ONCE A DAY
Qty: 5400 ML | Refills: 0 | Status: ACTIVE | COMMUNITY

## 2023-07-26 ENCOUNTER — OFFICE VISIT (OUTPATIENT)
Dept: URBAN - METROPOLITAN AREA CLINIC 78 | Facility: CLINIC | Age: 43
End: 2023-07-26
Payer: COMMERCIAL

## 2023-07-26 VITALS
RESPIRATION RATE: 16 BRPM | HEART RATE: 68 BPM | SYSTOLIC BLOOD PRESSURE: 108 MMHG | HEIGHT: 67 IN | BODY MASS INDEX: 32.49 KG/M2 | WEIGHT: 207 LBS | DIASTOLIC BLOOD PRESSURE: 73 MMHG | TEMPERATURE: 98.1 F

## 2023-07-26 DIAGNOSIS — K51.90 ULCERATIVE COLITIS: ICD-10-CM

## 2023-07-26 DIAGNOSIS — Z86.010 PERSONAL HISTORY OF COLONIC POLYPS: ICD-10-CM

## 2023-07-26 PROCEDURE — 99213 OFFICE O/P EST LOW 20 MIN: CPT | Performed by: INTERNAL MEDICINE

## 2023-07-26 RX ORDER — MESALAMINE 1.2 G/1
TAKE 4 TABLETS BY MOUTH ONCE DAILY WITH A MEAL TABLET, DELAYED RELEASE ORAL ONCE A DAY
Qty: 360 | Refills: 3 | Status: ACTIVE | COMMUNITY

## 2023-07-26 RX ORDER — SODIUM PICOSULFATE, MAGNESIUM OXIDE, AND ANHYDROUS CITRIC ACID 10; 3.5; 12 MG/160ML; G/160ML; G/160ML
160 ML DAY #1 AND 160 ML DAY # 2 LIQUID ORAL ONCE A DAY
Qty: 320 MILLIMETER | Refills: 0 | Status: ON HOLD | COMMUNITY
Start: 2020-11-11

## 2023-07-26 RX ORDER — VEDOLIZUMAB 300 MG/5ML
INFUSE 300 MG OVER 30 MTS AT WEEK 0 , WEEK 2 AND WEEK 6 AND THEN Q 8 WEEKS INJECTION, POWDER, LYOPHILIZED, FOR SOLUTION INTRAVENOUS ONCE A DAY
Qty: 2 | Refills: 4 | Status: ACTIVE | COMMUNITY

## 2023-07-26 RX ORDER — ESCITALOPRAM OXALATE 5 MG
TAKE 1 TABLET (5 MG) BY ORAL ROUTE ONCE DAILY TABLET ORAL 1
Qty: 0 | Refills: 0 | Status: ACTIVE | COMMUNITY
Start: 1900-01-01

## 2023-07-26 RX ORDER — MESALAMINE 4 G/60ML
60 ML AT BEDTIME ENEMA RECTAL ONCE A DAY
Qty: 5400 ML | Refills: 0 | Status: ACTIVE | COMMUNITY

## 2023-07-26 RX ORDER — VEDOLIZUMAB 300 MG/5ML
AS DIRECTED INJECTION, POWDER, LYOPHILIZED, FOR SOLUTION INTRAVENOUS
Qty: 10 | Refills: 0 | Status: ACTIVE | COMMUNITY

## 2023-07-26 RX ORDER — VEDOLIZUMAB 300 MG/5ML
AS DIRECTED INJECTION, POWDER, LYOPHILIZED, FOR SOLUTION INTRAVENOUS
Qty: 10 | Refills: 0 | OUTPATIENT

## 2023-07-26 RX ORDER — SODIUM, POTASSIUM,MAG SULFATES 17.5-3.13G
177 ML DAY 1 AND 177 ML DAY 2 SOLUTION, RECONSTITUTED, ORAL ORAL
Qty: 354 MILLILITER | Status: ACTIVE | COMMUNITY

## 2023-07-26 RX ORDER — MESALAMINE 1.2 G/1
2 TABLETS TABLET, DELAYED RELEASE ORAL ONCE A DAY
Qty: 180 TABLET | Refills: 3 | Status: ACTIVE | COMMUNITY

## 2023-07-26 NOTE — HPI-TODAY'S VISIT:
Patient is recovering from sinus infections  She feels well on till weeks 7 post infusion but she gets breakthrough symptoms most likely in 8 weeks  She is noticing weight gain and change in her cycles  Recent dx of Bursitis  I reviewed the colonoscopy findings and prep with patient . Reviewed the path removed including path results : No colitis  All questions answered  to satisfaction   Personal hx of Proctosigmoiditis  Dx in 2005  Currently on Entyvio   Mesalamine 4.8 gm /day [facility] : BM 1 a day formed or loose  No blood in stool  No mucus  Flatulence     Hep B s Ag neg ( immune )     Last colonoscopy 11/8/22 : Normal exam and  Last colonoscopy 11/5/2020      DATA : Does not tolerate oral Uceris or Prednisone ( hyperanxiety) 5/2017 PSA, 12.7

## 2023-08-22 ENCOUNTER — LAB OUTSIDE AN ENCOUNTER (OUTPATIENT)
Dept: URBAN - METROPOLITAN AREA CLINIC 78 | Facility: CLINIC | Age: 43
End: 2023-08-22

## 2023-08-23 LAB
IMMUNOGLOBULIN A: 118
IMMUNOGLOBULIN G: 1117
IMMUNOGLOBULIN M: 84

## 2023-09-11 ENCOUNTER — OFFICE VISIT (OUTPATIENT)
Dept: URBAN - METROPOLITAN AREA CLINIC 114 | Facility: CLINIC | Age: 43
End: 2023-09-11
Payer: COMMERCIAL

## 2023-09-11 VITALS
DIASTOLIC BLOOD PRESSURE: 80 MMHG | TEMPERATURE: 96.7 F | SYSTOLIC BLOOD PRESSURE: 119 MMHG | BODY MASS INDEX: 32.96 KG/M2 | RESPIRATION RATE: 18 BRPM | HEART RATE: 63 BPM | HEIGHT: 67 IN | WEIGHT: 210 LBS

## 2023-09-11 DIAGNOSIS — K51.80 OTHER ULCERATIVE COLITIS WITHOUT COMPLICATION: ICD-10-CM

## 2023-09-11 PROCEDURE — 96413 CHEMO IV INFUSION 1 HR: CPT | Performed by: INTERNAL MEDICINE

## 2023-09-11 RX ORDER — MESALAMINE 1.2 G/1
2 TABLETS TABLET, DELAYED RELEASE ORAL ONCE A DAY
Qty: 180 TABLET | Refills: 3 | Status: ACTIVE | COMMUNITY

## 2023-09-11 RX ORDER — SODIUM, POTASSIUM,MAG SULFATES 17.5-3.13G
177 ML DAY 1 AND 177 ML DAY 2 SOLUTION, RECONSTITUTED, ORAL ORAL
Qty: 354 MILLILITER | Status: ACTIVE | COMMUNITY

## 2023-09-11 RX ORDER — VEDOLIZUMAB 300 MG/5ML
AS DIRECTED INJECTION, POWDER, LYOPHILIZED, FOR SOLUTION INTRAVENOUS
Qty: 10 | Refills: 0 | Status: ACTIVE | COMMUNITY

## 2023-09-11 RX ORDER — VEDOLIZUMAB 300 MG/5ML
INFUSE 300 MG OVER 30 MTS AT WEEK 0 , WEEK 2 AND WEEK 6 AND THEN Q 8 WEEKS INJECTION, POWDER, LYOPHILIZED, FOR SOLUTION INTRAVENOUS ONCE A DAY
Qty: 2 | Refills: 4 | Status: ACTIVE | COMMUNITY

## 2023-09-11 RX ORDER — MESALAMINE 1.2 G/1
TAKE 4 TABLETS BY MOUTH ONCE DAILY WITH A MEAL TABLET, DELAYED RELEASE ORAL ONCE A DAY
Qty: 360 | Refills: 3 | Status: ACTIVE | COMMUNITY

## 2023-09-11 RX ORDER — ESCITALOPRAM OXALATE 5 MG
TAKE 1 TABLET (5 MG) BY ORAL ROUTE ONCE DAILY TABLET ORAL 1
Qty: 0 | Refills: 0 | Status: ACTIVE | COMMUNITY
Start: 1900-01-01

## 2023-09-11 RX ORDER — MESALAMINE 4 G/60ML
60 ML AT BEDTIME ENEMA RECTAL ONCE A DAY
Qty: 5400 ML | Refills: 0 | Status: ACTIVE | COMMUNITY

## 2023-09-11 RX ORDER — SODIUM PICOSULFATE, MAGNESIUM OXIDE, AND ANHYDROUS CITRIC ACID 10; 3.5; 12 MG/160ML; G/160ML; G/160ML
160 ML DAY #1 AND 160 ML DAY # 2 LIQUID ORAL ONCE A DAY
Qty: 320 MILLIMETER | Refills: 0 | Status: ON HOLD | COMMUNITY
Start: 2020-11-11

## 2023-09-18 ENCOUNTER — TELEPHONE ENCOUNTER (OUTPATIENT)
Dept: URBAN - METROPOLITAN AREA CLINIC 77 | Facility: CLINIC | Age: 43
End: 2023-09-18

## 2023-09-18 RX ORDER — VEDOLIZUMAB 300 MG/5ML
INFUSE 300 MG OVER 30 MTS AT WEEK 0 , WEEK 2 AND WEEK 6 AND THEN Q 8 WEEKS INJECTION, POWDER, LYOPHILIZED, FOR SOLUTION INTRAVENOUS ONCE A DAY
Qty: 2 | Refills: 4
End: 2024-07-14

## 2023-10-25 ENCOUNTER — OFFICE VISIT (OUTPATIENT)
Dept: URBAN - METROPOLITAN AREA CLINIC 78 | Facility: CLINIC | Age: 43
End: 2023-10-25

## 2023-10-30 ENCOUNTER — OFFICE VISIT (OUTPATIENT)
Dept: URBAN - METROPOLITAN AREA CLINIC 77 | Facility: CLINIC | Age: 43
End: 2023-10-30
Payer: COMMERCIAL

## 2023-10-30 VITALS
WEIGHT: 210 LBS | TEMPERATURE: 98.3 F | HEIGHT: 67 IN | SYSTOLIC BLOOD PRESSURE: 94 MMHG | DIASTOLIC BLOOD PRESSURE: 63 MMHG | BODY MASS INDEX: 32.96 KG/M2

## 2023-10-30 DIAGNOSIS — K51.80 OTHER ULCERATIVE COLITIS WITHOUT COMPLICATION: ICD-10-CM

## 2023-10-30 PROCEDURE — 96413 CHEMO IV INFUSION 1 HR: CPT | Performed by: INTERNAL MEDICINE

## 2023-10-30 RX ORDER — SODIUM, POTASSIUM,MAG SULFATES 17.5-3.13G
177 ML DAY 1 AND 177 ML DAY 2 SOLUTION, RECONSTITUTED, ORAL ORAL
Qty: 354 MILLILITER | Status: ACTIVE | COMMUNITY

## 2023-10-30 RX ORDER — MESALAMINE 4 G/60ML
60 ML AT BEDTIME ENEMA RECTAL ONCE A DAY
Qty: 5400 ML | Refills: 0 | Status: ACTIVE | COMMUNITY

## 2023-10-30 RX ORDER — MESALAMINE 1.2 G/1
2 TABLETS TABLET, DELAYED RELEASE ORAL ONCE A DAY
Qty: 180 TABLET | Refills: 3 | Status: ACTIVE | COMMUNITY

## 2023-10-30 RX ORDER — VEDOLIZUMAB 300 MG/5ML
INFUSE 300 MG OVER 30 MTS AT WEEK 0 , WEEK 2 AND WEEK 6 AND THEN Q 8 WEEKS INJECTION, POWDER, LYOPHILIZED, FOR SOLUTION INTRAVENOUS ONCE A DAY
Qty: 2 | Refills: 4 | Status: ACTIVE | COMMUNITY
End: 2024-07-14

## 2023-10-30 RX ORDER — SODIUM PICOSULFATE, MAGNESIUM OXIDE, AND ANHYDROUS CITRIC ACID 10; 3.5; 12 MG/160ML; G/160ML; G/160ML
160 ML DAY #1 AND 160 ML DAY # 2 LIQUID ORAL ONCE A DAY
Qty: 320 MILLIMETER | Refills: 0 | Status: ON HOLD | COMMUNITY
Start: 2020-11-11

## 2023-10-30 RX ORDER — VEDOLIZUMAB 300 MG/5ML
AS DIRECTED INJECTION, POWDER, LYOPHILIZED, FOR SOLUTION INTRAVENOUS
Qty: 10 | Refills: 0 | Status: ACTIVE | COMMUNITY

## 2023-10-30 RX ORDER — MESALAMINE 1.2 G/1
TAKE 4 TABLETS BY MOUTH ONCE DAILY WITH A MEAL TABLET, DELAYED RELEASE ORAL ONCE A DAY
Qty: 360 | Refills: 3 | Status: ACTIVE | COMMUNITY

## 2023-10-30 RX ORDER — ESCITALOPRAM OXALATE 5 MG
TAKE 1 TABLET (5 MG) BY ORAL ROUTE ONCE DAILY TABLET ORAL 1
Qty: 0 | Refills: 0 | Status: ACTIVE | COMMUNITY
Start: 1900-01-01

## 2023-11-16 ENCOUNTER — DASHBOARD ENCOUNTERS (OUTPATIENT)
Age: 43
End: 2023-11-16

## 2023-11-17 ENCOUNTER — OFFICE VISIT (OUTPATIENT)
Dept: URBAN - METROPOLITAN AREA CLINIC 78 | Facility: CLINIC | Age: 43
End: 2023-11-17
Payer: COMMERCIAL

## 2023-11-17 VITALS
WEIGHT: 203 LBS | HEART RATE: 91 BPM | DIASTOLIC BLOOD PRESSURE: 71 MMHG | BODY MASS INDEX: 31.86 KG/M2 | TEMPERATURE: 98.1 F | HEIGHT: 67 IN | RESPIRATION RATE: 16 BRPM | SYSTOLIC BLOOD PRESSURE: 104 MMHG

## 2023-11-17 DIAGNOSIS — R12 HEARTBURN: ICD-10-CM

## 2023-11-17 DIAGNOSIS — Z86.010 PERSONAL HISTORY OF COLONIC POLYPS: ICD-10-CM

## 2023-11-17 DIAGNOSIS — K51.90 ULCERATIVE COLITIS: ICD-10-CM

## 2023-11-17 PROCEDURE — 99213 OFFICE O/P EST LOW 20 MIN: CPT | Performed by: INTERNAL MEDICINE

## 2023-11-17 RX ORDER — ESCITALOPRAM OXALATE 5 MG
TAKE 1 TABLET (5 MG) BY ORAL ROUTE ONCE DAILY TABLET ORAL 1
Qty: 0 | Refills: 0 | Status: ACTIVE | COMMUNITY
Start: 1900-01-01

## 2023-11-17 RX ORDER — VEDOLIZUMAB 300 MG/5ML
INFUSE 300 MG OVER 30 MTS AT WEEK 0 , WEEK 2 AND WEEK 6 AND THEN Q 8 WEEKS INJECTION, POWDER, LYOPHILIZED, FOR SOLUTION INTRAVENOUS ONCE A DAY
Qty: 2 | Refills: 4 | Status: ACTIVE | COMMUNITY
End: 2024-07-14

## 2023-11-17 RX ORDER — MESALAMINE 4 G/60ML
60 ML AT BEDTIME ENEMA RECTAL ONCE A DAY
Qty: 5400 ML | Refills: 0 | Status: ACTIVE | COMMUNITY

## 2023-11-17 RX ORDER — VEDOLIZUMAB 300 MG/5ML
AS DIRECTED INJECTION, POWDER, LYOPHILIZED, FOR SOLUTION INTRAVENOUS
Qty: 10 | Refills: 0 | Status: ACTIVE | COMMUNITY

## 2023-11-17 RX ORDER — MESALAMINE 1.2 G/1
TAKE 4 TABLETS BY MOUTH ONCE DAILY WITH A MEAL TABLET, DELAYED RELEASE ORAL ONCE A DAY
Qty: 360 | Refills: 3 | Status: ACTIVE | COMMUNITY

## 2023-11-17 RX ORDER — SODIUM PICOSULFATE, MAGNESIUM OXIDE, AND ANHYDROUS CITRIC ACID 10; 3.5; 12 MG/160ML; G/160ML; G/160ML
160 ML DAY #1 AND 160 ML DAY # 2 LIQUID ORAL ONCE A DAY
Qty: 320 MILLIMETER | Refills: 0 | Status: ON HOLD | COMMUNITY
Start: 2020-11-11

## 2023-11-17 RX ORDER — MESALAMINE 1.2 G/1
2 TABLETS TABLET, DELAYED RELEASE ORAL ONCE A DAY
Qty: 180 TABLET | Refills: 3 | Status: ACTIVE | COMMUNITY

## 2023-11-17 RX ORDER — VEDOLIZUMAB 300 MG/5ML
INFUSE 300 MG OVER 30 MTS AT WEEK 0 , WEEK 2 AND WEEK 6 AND THEN Q 8 WEEKS INJECTION, POWDER, LYOPHILIZED, FOR SOLUTION INTRAVENOUS ONCE A DAY
Qty: 2 | Refills: 4

## 2023-11-17 RX ORDER — SODIUM, POTASSIUM,MAG SULFATES 17.5-3.13G
177 ML DAY 1 AND 177 ML DAY 2 SOLUTION, RECONSTITUTED, ORAL ORAL
Qty: 354 MILLILITER | Status: ON HOLD | COMMUNITY

## 2023-11-17 RX ORDER — FAMOTIDINE 40 MG/1
1 TABLET AT BEDTIME TABLET, FILM COATED ORAL ONCE A DAY
Qty: 90 TABLET | Refills: 1 | OUTPATIENT
Start: 2023-11-17

## 2023-11-17 NOTE — HPI-TODAY'S VISIT:
Patient is seen in follow-up.  He has been free of sinus infections.  Her immunoglobulin levels have been normal on lab work.  She is also started on a weight loss medication Ozempic compd  qweekly on sundays (5 lbs weight loss ) x 1 month   Reports her heartburn is gotten worse Denies dysphagia  Denies Fhx of gastroesophageal cancer     Personal hx of Proctosigmoiditis  Dx in 2005  Currently on Entyvio   Mesalamine 4.8 gm /day [facility] : BM qod , fatulence  No blood in stool  No mucus  Flatulence     Hep B s Ag neg ( immune )     Last colonoscopy 11/8/22 : Normal exam and  Last colonoscopy 11/5/2020      DATA : Does not tolerate oral Uceris or Prednisone ( hyperanxiety)

## 2023-12-18 ENCOUNTER — OFFICE VISIT (OUTPATIENT)
Dept: URBAN - METROPOLITAN AREA CLINIC 77 | Facility: CLINIC | Age: 43
End: 2023-12-18
Payer: COMMERCIAL

## 2023-12-18 VITALS
DIASTOLIC BLOOD PRESSURE: 68 MMHG | SYSTOLIC BLOOD PRESSURE: 107 MMHG | WEIGHT: 203 LBS | TEMPERATURE: 98.3 F | HEART RATE: 76 BPM | BODY MASS INDEX: 31.86 KG/M2 | HEIGHT: 67 IN | RESPIRATION RATE: 18 BRPM

## 2023-12-18 DIAGNOSIS — K51.80 OTHER ULCERATIVE COLITIS WITHOUT COMPLICATION: ICD-10-CM

## 2023-12-18 PROCEDURE — 96413 CHEMO IV INFUSION 1 HR: CPT | Performed by: INTERNAL MEDICINE

## 2023-12-18 RX ORDER — SODIUM, POTASSIUM,MAG SULFATES 17.5-3.13G
177 ML DAY 1 AND 177 ML DAY 2 SOLUTION, RECONSTITUTED, ORAL ORAL
Qty: 354 MILLILITER | Status: ON HOLD | COMMUNITY

## 2023-12-18 RX ORDER — FAMOTIDINE 40 MG/1
1 TABLET AT BEDTIME TABLET, FILM COATED ORAL ONCE A DAY
Qty: 90 TABLET | Refills: 1 | Status: ACTIVE | COMMUNITY
Start: 2023-11-17

## 2023-12-18 RX ORDER — VEDOLIZUMAB 300 MG/5ML
AS DIRECTED INJECTION, POWDER, LYOPHILIZED, FOR SOLUTION INTRAVENOUS
Qty: 10 | Refills: 0 | Status: ACTIVE | COMMUNITY

## 2023-12-18 RX ORDER — MESALAMINE 1.2 G/1
2 TABLETS TABLET, DELAYED RELEASE ORAL ONCE A DAY
Qty: 180 TABLET | Refills: 3 | Status: ACTIVE | COMMUNITY

## 2023-12-18 RX ORDER — VEDOLIZUMAB 300 MG/5ML
INFUSE 300 MG OVER 30 MTS AT WEEK 0 , WEEK 2 AND WEEK 6 AND THEN Q 8 WEEKS INJECTION, POWDER, LYOPHILIZED, FOR SOLUTION INTRAVENOUS ONCE A DAY
Qty: 2 | Refills: 4 | Status: ACTIVE | COMMUNITY

## 2023-12-18 RX ORDER — MESALAMINE 1.2 G/1
TAKE 4 TABLETS BY MOUTH ONCE DAILY WITH A MEAL TABLET, DELAYED RELEASE ORAL ONCE A DAY
Qty: 360 | Refills: 3 | Status: ACTIVE | COMMUNITY

## 2023-12-18 RX ORDER — ESCITALOPRAM OXALATE 5 MG
TAKE 1 TABLET (5 MG) BY ORAL ROUTE ONCE DAILY TABLET ORAL 1
Qty: 0 | Refills: 0 | Status: ACTIVE | COMMUNITY
Start: 1900-01-01

## 2023-12-18 RX ORDER — MESALAMINE 4 G/60ML
60 ML AT BEDTIME ENEMA RECTAL ONCE A DAY
Qty: 5400 ML | Refills: 0 | Status: ACTIVE | COMMUNITY

## 2023-12-18 RX ORDER — SODIUM PICOSULFATE, MAGNESIUM OXIDE, AND ANHYDROUS CITRIC ACID 10; 3.5; 12 MG/160ML; G/160ML; G/160ML
160 ML DAY #1 AND 160 ML DAY # 2 LIQUID ORAL ONCE A DAY
Qty: 320 MILLIMETER | Refills: 0 | Status: ON HOLD | COMMUNITY
Start: 2020-11-11

## 2024-01-17 ENCOUNTER — TELEPHONE ENCOUNTER (OUTPATIENT)
Dept: URBAN - METROPOLITAN AREA CLINIC 77 | Facility: CLINIC | Age: 44
End: 2024-01-17

## 2024-02-19 ENCOUNTER — ENT (OUTPATIENT)
Dept: URBAN - METROPOLITAN AREA CLINIC 77 | Facility: CLINIC | Age: 44
End: 2024-02-19
Payer: COMMERCIAL

## 2024-02-19 VITALS
HEIGHT: 67 IN | DIASTOLIC BLOOD PRESSURE: 70 MMHG | WEIGHT: 211 LBS | TEMPERATURE: 98.5 F | SYSTOLIC BLOOD PRESSURE: 106 MMHG | BODY MASS INDEX: 33.12 KG/M2

## 2024-02-19 DIAGNOSIS — K51.80 CHRONIC PANCOLONIC ULCERATIVE COLITIS: ICD-10-CM

## 2024-02-19 PROCEDURE — 96413 CHEMO IV INFUSION 1 HR: CPT | Performed by: INTERNAL MEDICINE

## 2024-02-19 RX ORDER — SODIUM PICOSULFATE, MAGNESIUM OXIDE, AND ANHYDROUS CITRIC ACID 10; 3.5; 12 MG/160ML; G/160ML; G/160ML
160 ML DAY #1 AND 160 ML DAY # 2 LIQUID ORAL ONCE A DAY
Qty: 320 MILLIMETER | Refills: 0 | Status: ON HOLD | COMMUNITY
Start: 2020-11-11

## 2024-02-19 RX ORDER — VEDOLIZUMAB 300 MG/5ML
AS DIRECTED INJECTION, POWDER, LYOPHILIZED, FOR SOLUTION INTRAVENOUS
Qty: 10 | Refills: 0 | Status: ACTIVE | COMMUNITY

## 2024-02-19 RX ORDER — ESCITALOPRAM OXALATE 5 MG
TAKE 1 TABLET (5 MG) BY ORAL ROUTE ONCE DAILY TABLET ORAL 1
Qty: 0 | Refills: 0 | Status: ACTIVE | COMMUNITY
Start: 1900-01-01

## 2024-02-19 RX ORDER — FAMOTIDINE 40 MG/1
1 TABLET AT BEDTIME TABLET, FILM COATED ORAL ONCE A DAY
Qty: 90 TABLET | Refills: 1 | Status: ACTIVE | COMMUNITY
Start: 2023-11-17

## 2024-02-19 RX ORDER — MESALAMINE 4 G/60ML
60 ML AT BEDTIME ENEMA RECTAL ONCE A DAY
Qty: 5400 ML | Refills: 0 | Status: ACTIVE | COMMUNITY

## 2024-02-19 RX ORDER — MESALAMINE 1.2 G/1
2 TABLETS TABLET, DELAYED RELEASE ORAL ONCE A DAY
Qty: 180 TABLET | Refills: 3 | Status: ACTIVE | COMMUNITY

## 2024-02-19 RX ORDER — SODIUM, POTASSIUM,MAG SULFATES 17.5-3.13G
177 ML DAY 1 AND 177 ML DAY 2 SOLUTION, RECONSTITUTED, ORAL ORAL
Qty: 354 MILLILITER | Status: ON HOLD | COMMUNITY

## 2024-02-19 RX ORDER — MESALAMINE 1.2 G/1
TAKE 4 TABLETS BY MOUTH ONCE DAILY WITH A MEAL TABLET, DELAYED RELEASE ORAL ONCE A DAY
Qty: 360 | Refills: 3 | Status: ACTIVE | COMMUNITY

## 2024-02-19 RX ORDER — VEDOLIZUMAB 300 MG/5ML
INFUSE 300 MG OVER 30 MTS AT WEEK 0 , WEEK 2 AND WEEK 6 AND THEN Q 8 WEEKS INJECTION, POWDER, LYOPHILIZED, FOR SOLUTION INTRAVENOUS ONCE A DAY
Qty: 2 | Refills: 4 | Status: ACTIVE | COMMUNITY

## 2024-04-15 ENCOUNTER — ENT (OUTPATIENT)
Dept: URBAN - METROPOLITAN AREA CLINIC 77 | Facility: CLINIC | Age: 44
End: 2024-04-15
Payer: COMMERCIAL

## 2024-04-15 VITALS
DIASTOLIC BLOOD PRESSURE: 68 MMHG | HEIGHT: 67 IN | WEIGHT: 209 LBS | BODY MASS INDEX: 32.8 KG/M2 | SYSTOLIC BLOOD PRESSURE: 101 MMHG | TEMPERATURE: 98.4 F

## 2024-04-15 DIAGNOSIS — K51.80 OTHER ULCERATIVE COLITIS WITHOUT COMPLICATION: ICD-10-CM

## 2024-04-15 PROCEDURE — 96413 CHEMO IV INFUSION 1 HR: CPT | Performed by: INTERNAL MEDICINE

## 2024-04-15 RX ORDER — SODIUM, POTASSIUM,MAG SULFATES 17.5-3.13G
177 ML DAY 1 AND 177 ML DAY 2 SOLUTION, RECONSTITUTED, ORAL ORAL
Qty: 354 MILLILITER | Status: ON HOLD | COMMUNITY

## 2024-04-15 RX ORDER — MESALAMINE 4 G/60ML
60 ML AT BEDTIME ENEMA RECTAL ONCE A DAY
Qty: 5400 ML | Refills: 0 | Status: ACTIVE | COMMUNITY

## 2024-04-15 RX ORDER — VEDOLIZUMAB 300 MG/5ML
INFUSE 300 MG OVER 30 MTS AT WEEK 0 , WEEK 2 AND WEEK 6 AND THEN Q 8 WEEKS INJECTION, POWDER, LYOPHILIZED, FOR SOLUTION INTRAVENOUS ONCE A DAY
Qty: 2 | Refills: 4 | Status: ACTIVE | COMMUNITY

## 2024-04-15 RX ORDER — VEDOLIZUMAB 300 MG/5ML
AS DIRECTED INJECTION, POWDER, LYOPHILIZED, FOR SOLUTION INTRAVENOUS
Qty: 10 | Refills: 0 | Status: ACTIVE | COMMUNITY

## 2024-04-15 RX ORDER — MESALAMINE 1.2 G/1
2 TABLETS TABLET, DELAYED RELEASE ORAL ONCE A DAY
Qty: 180 TABLET | Refills: 3 | Status: ACTIVE | COMMUNITY

## 2024-04-15 RX ORDER — ESCITALOPRAM OXALATE 5 MG
TAKE 1 TABLET (5 MG) BY ORAL ROUTE ONCE DAILY TABLET ORAL 1
Qty: 0 | Refills: 0 | Status: ACTIVE | COMMUNITY
Start: 1900-01-01

## 2024-04-15 RX ORDER — FAMOTIDINE 40 MG/1
1 TABLET AT BEDTIME TABLET, FILM COATED ORAL ONCE A DAY
Qty: 90 TABLET | Refills: 1 | Status: ACTIVE | COMMUNITY
Start: 2023-11-17

## 2024-04-15 RX ORDER — SODIUM PICOSULFATE, MAGNESIUM OXIDE, AND ANHYDROUS CITRIC ACID 10; 3.5; 12 MG/160ML; G/160ML; G/160ML
160 ML DAY #1 AND 160 ML DAY # 2 LIQUID ORAL ONCE A DAY
Qty: 320 MILLIMETER | Refills: 0 | Status: ON HOLD | COMMUNITY
Start: 2020-11-11

## 2024-04-15 RX ORDER — MESALAMINE 1.2 G/1
TAKE 4 TABLETS BY MOUTH ONCE DAILY WITH A MEAL TABLET, DELAYED RELEASE ORAL ONCE A DAY
Qty: 360 | Refills: 3 | Status: ACTIVE | COMMUNITY

## 2024-05-20 ENCOUNTER — OFFICE VISIT (OUTPATIENT)
Dept: URBAN - METROPOLITAN AREA CLINIC 78 | Facility: CLINIC | Age: 44
End: 2024-05-20

## 2024-06-10 ENCOUNTER — OFFICE VISIT (OUTPATIENT)
Dept: URBAN - METROPOLITAN AREA CLINIC 78 | Facility: CLINIC | Age: 44
End: 2024-06-10
Payer: COMMERCIAL

## 2024-06-10 ENCOUNTER — OFFICE VISIT (OUTPATIENT)
Dept: URBAN - METROPOLITAN AREA CLINIC 77 | Facility: CLINIC | Age: 44
End: 2024-06-10
Payer: COMMERCIAL

## 2024-06-10 ENCOUNTER — OFFICE VISIT (OUTPATIENT)
Dept: URBAN - METROPOLITAN AREA CLINIC 78 | Facility: CLINIC | Age: 44
End: 2024-06-10

## 2024-06-10 VITALS
DIASTOLIC BLOOD PRESSURE: 76 MMHG | WEIGHT: 206.8 LBS | HEIGHT: 67 IN | RESPIRATION RATE: 14 BRPM | HEART RATE: 91 BPM | BODY MASS INDEX: 32.46 KG/M2 | TEMPERATURE: 98.1 F | SYSTOLIC BLOOD PRESSURE: 111 MMHG

## 2024-06-10 VITALS
DIASTOLIC BLOOD PRESSURE: 71 MMHG | TEMPERATURE: 98.5 F | WEIGHT: 209 LBS | SYSTOLIC BLOOD PRESSURE: 107 MMHG | HEIGHT: 67 IN | BODY MASS INDEX: 32.8 KG/M2

## 2024-06-10 DIAGNOSIS — K51.90 ULCERATIVE COLITIS: ICD-10-CM

## 2024-06-10 DIAGNOSIS — R12 HEARTBURN: ICD-10-CM

## 2024-06-10 DIAGNOSIS — K51.80 CHRONIC PANCOLONIC ULCERATIVE COLITIS: ICD-10-CM

## 2024-06-10 PROCEDURE — 96413 CHEMO IV INFUSION 1 HR: CPT | Performed by: INTERNAL MEDICINE

## 2024-06-10 PROCEDURE — 99213 OFFICE O/P EST LOW 20 MIN: CPT | Performed by: INTERNAL MEDICINE

## 2024-06-10 RX ORDER — ESCITALOPRAM OXALATE 5 MG
TAKE 1 TABLET (5 MG) BY ORAL ROUTE ONCE DAILY TABLET ORAL 1
Qty: 0 | Refills: 0 | Status: ON HOLD | COMMUNITY
Start: 1900-01-01

## 2024-06-10 RX ORDER — MESALAMINE 4 G/60ML
60 ML AT BEDTIME ENEMA RECTAL ONCE A DAY
Qty: 5400 ML | Refills: 0 | Status: ON HOLD | COMMUNITY

## 2024-06-10 RX ORDER — MESALAMINE 1.2 G/1
2 TABLETS TABLET, DELAYED RELEASE ORAL ONCE A DAY
Qty: 180 TABLET | Refills: 3 | Status: ON HOLD | COMMUNITY

## 2024-06-10 RX ORDER — VEDOLIZUMAB 300 MG/5ML
AS DIRECTED INJECTION, POWDER, LYOPHILIZED, FOR SOLUTION INTRAVENOUS
Qty: 10 | Refills: 0 | Status: ACTIVE | COMMUNITY

## 2024-06-10 RX ORDER — SODIUM PICOSULFATE, MAGNESIUM OXIDE, AND ANHYDROUS CITRIC ACID 10; 3.5; 12 MG/160ML; G/160ML; G/160ML
160 ML DAY #1 AND 160 ML DAY # 2 LIQUID ORAL ONCE A DAY
Qty: 320 MILLIMETER | Refills: 0 | Status: ON HOLD | COMMUNITY
Start: 2020-11-11

## 2024-06-10 RX ORDER — FAMOTIDINE 40 MG/1
1 TABLET AT BEDTIME TABLET, FILM COATED ORAL ONCE A DAY
Qty: 90 TABLET | Refills: 1 | Status: ACTIVE | COMMUNITY

## 2024-06-10 RX ORDER — FAMOTIDINE 40 MG/1
1 TABLET AT BEDTIME TABLET, FILM COATED ORAL ONCE A DAY
Qty: 90 TABLET | Refills: 1 | OUTPATIENT

## 2024-06-10 RX ORDER — SODIUM, POTASSIUM,MAG SULFATES 17.5-3.13G
177 ML DAY 1 AND 177 ML DAY 2 SOLUTION, RECONSTITUTED, ORAL ORAL
Qty: 354 MILLILITER | Status: ON HOLD | COMMUNITY

## 2024-06-10 RX ORDER — MESALAMINE 1.2 G/1
TAKE 4 TABLETS BY MOUTH ONCE DAILY WITH A MEAL TABLET, DELAYED RELEASE ORAL ONCE A DAY
Qty: 360 | Refills: 3 | Status: ON HOLD | COMMUNITY

## 2024-06-10 RX ORDER — FAMOTIDINE 40 MG/1
1 TABLET AT BEDTIME TABLET, FILM COATED ORAL ONCE A DAY
Qty: 90 TABLET | Refills: 1 | Status: ON HOLD | COMMUNITY
Start: 2023-11-17

## 2024-06-10 RX ORDER — VEDOLIZUMAB 300 MG/5ML
INFUSE 300 MG OVER 30 MTS AT WEEK 0 , WEEK 2 AND WEEK 6 AND THEN Q 8 WEEKS INJECTION, POWDER, LYOPHILIZED, FOR SOLUTION INTRAVENOUS ONCE A DAY
Qty: 2 | Refills: 4 | Status: ON HOLD | COMMUNITY

## 2024-06-10 NOTE — HPI-TODAY'S VISIT:
Patient is seen in follow-up Provider she has in the interim establish with [facility] Provider and has noted to have hormone imbalances.  She is currently on hormone replacement therapies.  She has also been referred to rheumatologist who did not think her autoimmune marker was reflective of lupus.  She had low blood pressure and her blood pressure medication was discontinued including her Lexapro with the intent of starting her on Contrave but patient did not tolerate that currently trying to lose weight off of any weight loss medications.  In the past she did not tolerate Ozempic mainly did not help with weight loss.  She is trying to eat better and is also started walking.  From a UC standpoint she is doing well and feels her symptoms return right before she is due for her infusion.    She has been free of sinus infections.  Her immunoglobulin levels have been normal on lab work.  Reports her heartburn is gotten worse Denies dysphagia  Denies Fhx of gastroesophageal cancer     Personal hx of Proctosigmoiditis  Dx in 2005  Currently on Entyvio   Mesalamine 4.8 gm /day [facility] : BM qod , fatulence  No blood in stool  No mucus  Flatulence     Hep B s Ag neg ( immune )     Last colonoscopy 11/8/22 : Normal exam  Last colonoscopy 11/5/2020      DATA : Does not tolerate oral Uceris or Prednisone ( hyperanxiety)

## 2024-08-05 ENCOUNTER — OFFICE VISIT (OUTPATIENT)
Dept: URBAN - METROPOLITAN AREA CLINIC 77 | Facility: CLINIC | Age: 44
End: 2024-08-05
Payer: COMMERCIAL

## 2024-08-05 VITALS
DIASTOLIC BLOOD PRESSURE: 73 MMHG | SYSTOLIC BLOOD PRESSURE: 99 MMHG | HEIGHT: 67 IN | BODY MASS INDEX: 32.02 KG/M2 | WEIGHT: 204 LBS | TEMPERATURE: 98.4 F

## 2024-08-05 DIAGNOSIS — K51.80 CHRONIC PANCOLONIC ULCERATIVE COLITIS: ICD-10-CM

## 2024-08-05 PROCEDURE — 96413 CHEMO IV INFUSION 1 HR: CPT | Performed by: INTERNAL MEDICINE

## 2024-08-05 RX ORDER — VEDOLIZUMAB 300 MG/5ML
AS DIRECTED INJECTION, POWDER, LYOPHILIZED, FOR SOLUTION INTRAVENOUS
Qty: 10 | Refills: 0 | Status: ACTIVE | COMMUNITY

## 2024-08-05 RX ORDER — MESALAMINE 4 G/60ML
60 ML AT BEDTIME ENEMA RECTAL ONCE A DAY
Qty: 5400 ML | Refills: 0 | Status: ON HOLD | COMMUNITY

## 2024-08-05 RX ORDER — MESALAMINE 1.2 G/1
2 TABLETS TABLET, DELAYED RELEASE ORAL ONCE A DAY
Qty: 180 TABLET | Refills: 3 | Status: ON HOLD | COMMUNITY

## 2024-08-05 RX ORDER — ESCITALOPRAM OXALATE 5 MG
TAKE 1 TABLET (5 MG) BY ORAL ROUTE ONCE DAILY TABLET ORAL 1
Qty: 0 | Refills: 0 | Status: ON HOLD | COMMUNITY
Start: 1900-01-01

## 2024-08-05 RX ORDER — SODIUM, POTASSIUM,MAG SULFATES 17.5-3.13G
177 ML DAY 1 AND 177 ML DAY 2 SOLUTION, RECONSTITUTED, ORAL ORAL
Qty: 354 MILLILITER | Status: ON HOLD | COMMUNITY

## 2024-08-05 RX ORDER — FAMOTIDINE 40 MG/1
1 TABLET AT BEDTIME TABLET, FILM COATED ORAL ONCE A DAY
Qty: 90 TABLET | Refills: 1 | Status: ACTIVE | COMMUNITY

## 2024-08-05 RX ORDER — SODIUM PICOSULFATE, MAGNESIUM OXIDE, AND ANHYDROUS CITRIC ACID 10; 3.5; 12 MG/160ML; G/160ML; G/160ML
160 ML DAY #1 AND 160 ML DAY # 2 LIQUID ORAL ONCE A DAY
Qty: 320 MILLIMETER | Refills: 0 | Status: ON HOLD | COMMUNITY
Start: 2020-11-11

## 2024-08-05 RX ORDER — VEDOLIZUMAB 300 MG/5ML
INFUSE 300 MG OVER 30 MTS AT WEEK 0 , WEEK 2 AND WEEK 6 AND THEN Q 8 WEEKS INJECTION, POWDER, LYOPHILIZED, FOR SOLUTION INTRAVENOUS ONCE A DAY
Qty: 2 | Refills: 4 | Status: ON HOLD | COMMUNITY

## 2024-08-05 RX ORDER — MESALAMINE 1.2 G/1
TAKE 4 TABLETS BY MOUTH ONCE DAILY WITH A MEAL TABLET, DELAYED RELEASE ORAL ONCE A DAY
Qty: 360 | Refills: 3 | Status: ON HOLD | COMMUNITY

## 2024-09-30 ENCOUNTER — OFFICE VISIT (OUTPATIENT)
Dept: URBAN - METROPOLITAN AREA CLINIC 77 | Facility: CLINIC | Age: 44
End: 2024-09-30

## 2024-10-07 ENCOUNTER — OFFICE VISIT (OUTPATIENT)
Dept: URBAN - METROPOLITAN AREA CLINIC 114 | Facility: CLINIC | Age: 44
End: 2024-10-07
Payer: COMMERCIAL

## 2024-10-07 VITALS
HEART RATE: 78 BPM | SYSTOLIC BLOOD PRESSURE: 112 MMHG | DIASTOLIC BLOOD PRESSURE: 78 MMHG | TEMPERATURE: 90.8 F | WEIGHT: 203 LBS | HEIGHT: 67 IN | BODY MASS INDEX: 31.86 KG/M2 | RESPIRATION RATE: 20 BRPM

## 2024-10-07 DIAGNOSIS — K51.90 ULCERATIVE COLITIS: ICD-10-CM

## 2024-10-07 PROCEDURE — 96413 CHEMO IV INFUSION 1 HR: CPT | Performed by: INTERNAL MEDICINE

## 2024-10-07 RX ORDER — FAMOTIDINE 40 MG/1
1 TABLET AT BEDTIME TABLET, FILM COATED ORAL ONCE A DAY
Qty: 90 TABLET | Refills: 1 | Status: ACTIVE | COMMUNITY

## 2024-10-07 RX ORDER — MESALAMINE 4 G/60ML
60 ML AT BEDTIME ENEMA RECTAL ONCE A DAY
Qty: 5400 ML | Refills: 0 | Status: ON HOLD | COMMUNITY

## 2024-10-07 RX ORDER — SODIUM, POTASSIUM,MAG SULFATES 17.5-3.13G
177 ML DAY 1 AND 177 ML DAY 2 SOLUTION, RECONSTITUTED, ORAL ORAL
Qty: 354 MILLILITER | Status: ON HOLD | COMMUNITY

## 2024-10-07 RX ORDER — ESCITALOPRAM OXALATE 5 MG
TAKE 1 TABLET (5 MG) BY ORAL ROUTE ONCE DAILY TABLET ORAL 1
Qty: 0 | Refills: 0 | Status: ON HOLD | COMMUNITY
Start: 1900-01-01

## 2024-10-07 RX ORDER — VEDOLIZUMAB 300 MG/5ML
AS DIRECTED INJECTION, POWDER, LYOPHILIZED, FOR SOLUTION INTRAVENOUS
Qty: 10 | Refills: 0 | Status: ACTIVE | COMMUNITY

## 2024-10-07 RX ORDER — SODIUM PICOSULFATE, MAGNESIUM OXIDE, AND ANHYDROUS CITRIC ACID 10; 3.5; 12 MG/160ML; G/160ML; G/160ML
160 ML DAY #1 AND 160 ML DAY # 2 LIQUID ORAL ONCE A DAY
Qty: 320 MILLIMETER | Refills: 0 | Status: ON HOLD | COMMUNITY
Start: 2020-11-11

## 2024-10-07 RX ORDER — MESALAMINE 1.2 G/1
2 TABLETS TABLET, DELAYED RELEASE ORAL ONCE A DAY
Qty: 180 TABLET | Refills: 3 | Status: ON HOLD | COMMUNITY

## 2024-10-07 RX ORDER — VEDOLIZUMAB 300 MG/5ML
INFUSE 300 MG OVER 30 MTS AT WEEK 0 , WEEK 2 AND WEEK 6 AND THEN Q 8 WEEKS INJECTION, POWDER, LYOPHILIZED, FOR SOLUTION INTRAVENOUS ONCE A DAY
Qty: 2 | Refills: 4 | Status: ON HOLD | COMMUNITY

## 2024-10-07 RX ORDER — MESALAMINE 1.2 G/1
TAKE 4 TABLETS BY MOUTH ONCE DAILY WITH A MEAL TABLET, DELAYED RELEASE ORAL ONCE A DAY
Qty: 360 | Refills: 3 | Status: ON HOLD | COMMUNITY

## 2024-11-25 ENCOUNTER — TELEPHONE ENCOUNTER (OUTPATIENT)
Dept: URBAN - METROPOLITAN AREA CLINIC 97 | Facility: CLINIC | Age: 44
End: 2024-11-25

## 2024-11-25 ENCOUNTER — OFFICE VISIT (OUTPATIENT)
Dept: URBAN - METROPOLITAN AREA CLINIC 77 | Facility: CLINIC | Age: 44
End: 2024-11-25
Payer: COMMERCIAL

## 2024-11-25 VITALS
WEIGHT: 206 LBS | RESPIRATION RATE: 20 BRPM | DIASTOLIC BLOOD PRESSURE: 73 MMHG | BODY MASS INDEX: 32.33 KG/M2 | HEIGHT: 67 IN | SYSTOLIC BLOOD PRESSURE: 123 MMHG | HEART RATE: 76 BPM | TEMPERATURE: 98.2 F

## 2024-11-25 DIAGNOSIS — K51.90 ULCERATIVE COLITIS: ICD-10-CM

## 2024-11-25 PROCEDURE — 96413 CHEMO IV INFUSION 1 HR: CPT | Performed by: INTERNAL MEDICINE

## 2024-11-25 RX ORDER — VEDOLIZUMAB 300 MG/5ML
INFUSE 300 MG OVER 30 MTS AT WEEK 0 , WEEK 2 AND WEEK 6 AND THEN Q 8 WEEKS INJECTION, POWDER, LYOPHILIZED, FOR SOLUTION INTRAVENOUS ONCE A DAY
Qty: 2 | Refills: 4 | Status: ON HOLD | COMMUNITY

## 2024-11-25 RX ORDER — MESALAMINE 1.2 G/1
TAKE 4 TABLETS BY MOUTH ONCE DAILY WITH A MEAL TABLET, DELAYED RELEASE ORAL ONCE A DAY
Qty: 360 | Refills: 3 | Status: ON HOLD | COMMUNITY

## 2024-11-25 RX ORDER — MESALAMINE 1.2 G/1
2 TABLETS TABLET, DELAYED RELEASE ORAL ONCE A DAY
Qty: 180 TABLET | Refills: 3 | Status: ON HOLD | COMMUNITY

## 2024-11-25 RX ORDER — FAMOTIDINE 40 MG/1
1 TABLET AT BEDTIME TABLET, FILM COATED ORAL ONCE A DAY
Qty: 90 TABLET | Refills: 1 | Status: ACTIVE | COMMUNITY

## 2024-11-25 RX ORDER — MESALAMINE 4 G/60ML
60 ML AT BEDTIME ENEMA RECTAL ONCE A DAY
Qty: 5400 ML | Refills: 0 | Status: ON HOLD | COMMUNITY

## 2024-11-25 RX ORDER — VEDOLIZUMAB 300 MG/5ML
AS DIRECTED INJECTION, POWDER, LYOPHILIZED, FOR SOLUTION INTRAVENOUS
Qty: 10 | Refills: 0 | Status: ACTIVE | COMMUNITY

## 2024-11-25 RX ORDER — SODIUM PICOSULFATE, MAGNESIUM OXIDE, AND ANHYDROUS CITRIC ACID 10; 3.5; 12 MG/160ML; G/160ML; G/160ML
160 ML DAY #1 AND 160 ML DAY # 2 LIQUID ORAL ONCE A DAY
Qty: 320 MILLIMETER | Refills: 0 | Status: ON HOLD | COMMUNITY
Start: 2020-11-11

## 2024-11-25 RX ORDER — ESCITALOPRAM OXALATE 5 MG
TAKE 1 TABLET (5 MG) BY ORAL ROUTE ONCE DAILY TABLET ORAL 1
Qty: 0 | Refills: 0 | Status: ON HOLD | COMMUNITY
Start: 1900-01-01

## 2024-11-25 RX ORDER — SODIUM, POTASSIUM,MAG SULFATES 17.5-3.13G
177 ML DAY 1 AND 177 ML DAY 2 SOLUTION, RECONSTITUTED, ORAL ORAL
Qty: 354 MILLILITER | Status: ON HOLD | COMMUNITY

## 2025-01-27 ENCOUNTER — OFFICE VISIT (OUTPATIENT)
Dept: URBAN - METROPOLITAN AREA CLINIC 77 | Facility: CLINIC | Age: 45
End: 2025-01-27
Payer: COMMERCIAL

## 2025-01-27 VITALS
DIASTOLIC BLOOD PRESSURE: 66 MMHG | RESPIRATION RATE: 20 BRPM | HEIGHT: 67 IN | TEMPERATURE: 98 F | BODY MASS INDEX: 32.15 KG/M2 | SYSTOLIC BLOOD PRESSURE: 99 MMHG | WEIGHT: 204.8 LBS

## 2025-01-27 DIAGNOSIS — K51.90 ULCERATIVE COLITIS: ICD-10-CM

## 2025-01-27 PROCEDURE — 96413 CHEMO IV INFUSION 1 HR: CPT | Performed by: INTERNAL MEDICINE

## 2025-01-27 RX ORDER — SODIUM PICOSULFATE, MAGNESIUM OXIDE, AND ANHYDROUS CITRIC ACID 10; 3.5; 12 MG/160ML; G/160ML; G/160ML
160 ML DAY #1 AND 160 ML DAY # 2 LIQUID ORAL ONCE A DAY
Qty: 320 MILLIMETER | Refills: 0 | Status: ON HOLD | COMMUNITY
Start: 2020-11-11

## 2025-01-27 RX ORDER — VEDOLIZUMAB 300 MG/5ML
INFUSE 300 MG OVER 30 MTS AT WEEK 0 , WEEK 2 AND WEEK 6 AND THEN Q 8 WEEKS INJECTION, POWDER, LYOPHILIZED, FOR SOLUTION INTRAVENOUS ONCE A DAY
Qty: 2 | Refills: 4 | Status: ON HOLD | COMMUNITY

## 2025-01-27 RX ORDER — MESALAMINE 1.2 G/1
2 TABLETS TABLET, DELAYED RELEASE ORAL ONCE A DAY
Qty: 180 TABLET | Refills: 3 | Status: ON HOLD | COMMUNITY

## 2025-01-27 RX ORDER — FAMOTIDINE 40 MG/1
1 TABLET AT BEDTIME TABLET, FILM COATED ORAL ONCE A DAY
Qty: 90 TABLET | Refills: 1 | Status: ACTIVE | COMMUNITY

## 2025-01-27 RX ORDER — ESCITALOPRAM OXALATE 5 MG
TAKE 1 TABLET (5 MG) BY ORAL ROUTE ONCE DAILY TABLET ORAL 1
Qty: 0 | Refills: 0 | Status: ON HOLD | COMMUNITY
Start: 1900-01-01

## 2025-01-27 RX ORDER — MESALAMINE 4 G/60ML
60 ML AT BEDTIME ENEMA RECTAL ONCE A DAY
Qty: 5400 ML | Refills: 0 | Status: ON HOLD | COMMUNITY

## 2025-01-27 RX ORDER — VEDOLIZUMAB 300 MG/5ML
AS DIRECTED INJECTION, POWDER, LYOPHILIZED, FOR SOLUTION INTRAVENOUS
Qty: 10 | Refills: 0 | Status: ACTIVE | COMMUNITY
End: 2025-03-03

## 2025-01-27 RX ORDER — MESALAMINE 1.2 G/1
TAKE 4 TABLETS BY MOUTH ONCE DAILY WITH A MEAL TABLET, DELAYED RELEASE ORAL ONCE A DAY
Qty: 360 | Refills: 3 | Status: ON HOLD | COMMUNITY

## 2025-01-27 RX ORDER — SODIUM, POTASSIUM,MAG SULFATES 17.5-3.13G
177 ML DAY 1 AND 177 ML DAY 2 SOLUTION, RECONSTITUTED, ORAL ORAL
Qty: 354 MILLILITER | Status: ON HOLD | COMMUNITY

## 2025-03-31 ENCOUNTER — OFFICE VISIT (OUTPATIENT)
Dept: URBAN - METROPOLITAN AREA CLINIC 77 | Facility: CLINIC | Age: 45
End: 2025-03-31

## 2025-03-31 RX ORDER — VEDOLIZUMAB 300 MG/5ML
INFUSE 300 MG OVER 30 MTS AT WEEK 0 , WEEK 2 AND WEEK 6 AND THEN Q 8 WEEKS INJECTION, POWDER, LYOPHILIZED, FOR SOLUTION INTRAVENOUS ONCE A DAY
Qty: 2 | Refills: 4 | Status: ON HOLD | COMMUNITY

## 2025-03-31 RX ORDER — FAMOTIDINE 40 MG/1
1 TABLET AT BEDTIME TABLET, FILM COATED ORAL ONCE A DAY
Qty: 90 TABLET | Refills: 1 | Status: ACTIVE | COMMUNITY

## 2025-03-31 RX ORDER — MESALAMINE 4 G/60ML
60 ML AT BEDTIME ENEMA RECTAL ONCE A DAY
Qty: 5400 ML | Refills: 0 | Status: ON HOLD | COMMUNITY

## 2025-03-31 RX ORDER — MESALAMINE 1.2 G/1
TAKE 4 TABLETS BY MOUTH ONCE DAILY WITH A MEAL TABLET, DELAYED RELEASE ORAL ONCE A DAY
Qty: 360 | Refills: 3 | Status: ON HOLD | COMMUNITY

## 2025-03-31 RX ORDER — SODIUM, POTASSIUM,MAG SULFATES 17.5-3.13G
177 ML DAY 1 AND 177 ML DAY 2 SOLUTION, RECONSTITUTED, ORAL ORAL
Qty: 354 MILLILITER | Status: ON HOLD | COMMUNITY

## 2025-03-31 RX ORDER — SODIUM PICOSULFATE, MAGNESIUM OXIDE, AND ANHYDROUS CITRIC ACID 10; 3.5; 12 MG/160ML; G/160ML; G/160ML
160 ML DAY #1 AND 160 ML DAY # 2 LIQUID ORAL ONCE A DAY
Qty: 320 MILLIMETER | Refills: 0 | Status: ON HOLD | COMMUNITY
Start: 2020-11-11

## 2025-03-31 RX ORDER — ESCITALOPRAM OXALATE 5 MG
TAKE 1 TABLET (5 MG) BY ORAL ROUTE ONCE DAILY TABLET ORAL 1
Qty: 0 | Refills: 0 | Status: ON HOLD | COMMUNITY
Start: 1900-01-01

## 2025-03-31 RX ORDER — MESALAMINE 1.2 G/1
2 TABLETS TABLET, DELAYED RELEASE ORAL ONCE A DAY
Qty: 180 TABLET | Refills: 3 | Status: ON HOLD | COMMUNITY

## 2025-05-30 ENCOUNTER — OFFICE VISIT (OUTPATIENT)
Dept: URBAN - METROPOLITAN AREA CLINIC 77 | Facility: CLINIC | Age: 45
End: 2025-05-30

## 2025-05-30 RX ORDER — FAMOTIDINE 40 MG/1
1 TABLET AT BEDTIME TABLET, FILM COATED ORAL ONCE A DAY
Qty: 90 TABLET | Refills: 1 | Status: ACTIVE | COMMUNITY

## 2025-05-30 RX ORDER — VEDOLIZUMAB 300 MG/5ML
INFUSE 300 MG OVER 30 MTS AT WEEK 0 , WEEK 2 AND WEEK 6 AND THEN Q 8 WEEKS INJECTION, POWDER, LYOPHILIZED, FOR SOLUTION INTRAVENOUS ONCE A DAY
Qty: 2 | Refills: 4 | Status: ON HOLD | COMMUNITY

## 2025-05-30 RX ORDER — SODIUM PICOSULFATE, MAGNESIUM OXIDE, AND ANHYDROUS CITRIC ACID 10; 3.5; 12 MG/160ML; G/160ML; G/160ML
160 ML DAY #1 AND 160 ML DAY # 2 LIQUID ORAL ONCE A DAY
Qty: 320 MILLIMETER | Refills: 0 | Status: ON HOLD | COMMUNITY
Start: 2020-11-11

## 2025-05-30 RX ORDER — MESALAMINE 1.2 G/1
2 TABLETS TABLET, DELAYED RELEASE ORAL ONCE A DAY
Qty: 180 TABLET | Refills: 3 | Status: ON HOLD | COMMUNITY

## 2025-05-30 RX ORDER — MESALAMINE 1.2 G/1
TAKE 4 TABLETS BY MOUTH ONCE DAILY WITH A MEAL TABLET, DELAYED RELEASE ORAL ONCE A DAY
Qty: 360 | Refills: 3 | Status: ON HOLD | COMMUNITY

## 2025-05-30 RX ORDER — SODIUM, POTASSIUM,MAG SULFATES 17.5-3.13G
177 ML DAY 1 AND 177 ML DAY 2 SOLUTION, RECONSTITUTED, ORAL ORAL
Qty: 354 MILLILITER | Status: ON HOLD | COMMUNITY

## 2025-05-30 RX ORDER — MESALAMINE 4 G/60ML
60 ML AT BEDTIME ENEMA RECTAL ONCE A DAY
Qty: 5400 ML | Refills: 0 | Status: ON HOLD | COMMUNITY

## 2025-05-30 RX ORDER — ESCITALOPRAM OXALATE 5 MG
TAKE 1 TABLET (5 MG) BY ORAL ROUTE ONCE DAILY TABLET ORAL 1
Qty: 0 | Refills: 0 | Status: ON HOLD | COMMUNITY
Start: 1900-01-01

## 2025-07-14 ENCOUNTER — OFFICE VISIT (OUTPATIENT)
Dept: URBAN - METROPOLITAN AREA CLINIC 77 | Facility: CLINIC | Age: 45
End: 2025-07-14

## 2025-07-14 ENCOUNTER — OFFICE VISIT (OUTPATIENT)
Dept: URBAN - METROPOLITAN AREA CLINIC 77 | Facility: CLINIC | Age: 45
End: 2025-07-14
Payer: COMMERCIAL

## 2025-07-14 ENCOUNTER — TELEPHONE ENCOUNTER (OUTPATIENT)
Dept: URBAN - METROPOLITAN AREA CLINIC 78 | Facility: CLINIC | Age: 45
End: 2025-07-14

## 2025-07-14 DIAGNOSIS — K51.90 ACUTE ULCERATIVE COLITIS: ICD-10-CM

## 2025-07-14 PROCEDURE — 96413 CHEMO IV INFUSION 1 HR: CPT | Performed by: INTERNAL MEDICINE

## 2025-07-14 RX ORDER — SODIUM PICOSULFATE, MAGNESIUM OXIDE, AND ANHYDROUS CITRIC ACID 10; 3.5; 12 MG/160ML; G/160ML; G/160ML
160 ML DAY #1 AND 160 ML DAY # 2 LIQUID ORAL ONCE A DAY
Qty: 320 MILLIMETER | Refills: 0 | Status: ON HOLD | COMMUNITY
Start: 2020-11-11

## 2025-07-14 RX ORDER — MESALAMINE 4 G/60ML
60 ML AT BEDTIME ENEMA RECTAL ONCE A DAY
Qty: 5400 ML | Refills: 0 | Status: ON HOLD | COMMUNITY

## 2025-07-14 RX ORDER — VEDOLIZUMAB 300 MG/5ML
INFUSE 300 MG OVER 30 MTS AT WEEK 0 , WEEK 2 AND WEEK 6 AND THEN Q 8 WEEKS INJECTION, POWDER, LYOPHILIZED, FOR SOLUTION INTRAVENOUS ONCE A DAY
Qty: 2 | Refills: 4 | Status: ON HOLD | COMMUNITY

## 2025-07-14 RX ORDER — ACETAMINOPHEN 650 MG
2 TABLETS AS NEEDED TABLET, EXTENDED RELEASE ORAL
OUTPATIENT
Start: 2025-07-15

## 2025-07-14 RX ORDER — MESALAMINE 1.2 G/1
TAKE 4 TABLETS BY MOUTH ONCE DAILY WITH A MEAL TABLET, DELAYED RELEASE ORAL ONCE A DAY
Qty: 360 | Refills: 3 | Status: ON HOLD | COMMUNITY

## 2025-07-14 RX ORDER — FAMOTIDINE 40 MG/1
1 TABLET AT BEDTIME TABLET, FILM COATED ORAL ONCE A DAY
Qty: 90 TABLET | Refills: 1 | Status: ACTIVE | COMMUNITY

## 2025-07-14 RX ORDER — SODIUM, POTASSIUM,MAG SULFATES 17.5-3.13G
177 ML DAY 1 AND 177 ML DAY 2 SOLUTION, RECONSTITUTED, ORAL ORAL
Qty: 354 MILLILITER | Status: ON HOLD | COMMUNITY

## 2025-07-14 RX ORDER — HYDROCORTISONE SODIUM SUCCINATE 100 MG/2ML
AS DIRECTED INJECTION, POWDER, FOR SOLUTION INTRAMUSCULAR; INTRAVENOUS
OUTPATIENT
Start: 2025-07-15

## 2025-07-14 RX ORDER — ESCITALOPRAM OXALATE 5 MG
TAKE 1 TABLET (5 MG) BY ORAL ROUTE ONCE DAILY TABLET ORAL 1
Qty: 0 | Refills: 0 | Status: ON HOLD | COMMUNITY
Start: 1900-01-01

## 2025-07-14 RX ORDER — MESALAMINE 1.2 G/1
2 TABLETS TABLET, DELAYED RELEASE ORAL ONCE A DAY
Qty: 180 TABLET | Refills: 3 | Status: ON HOLD | COMMUNITY

## 2025-07-18 ENCOUNTER — OFFICE VISIT (OUTPATIENT)
Dept: URBAN - METROPOLITAN AREA CLINIC 78 | Facility: CLINIC | Age: 45
End: 2025-07-18
Payer: COMMERCIAL

## 2025-07-18 DIAGNOSIS — K51.90 ULCERATIVE COLITIS: ICD-10-CM

## 2025-07-18 DIAGNOSIS — Z86.0101 PERSONAL HISTORY OF ADENOMATOUS AND SERRATED COLON POLYPS: ICD-10-CM

## 2025-07-18 PROCEDURE — 99214 OFFICE O/P EST MOD 30 MIN: CPT | Performed by: INTERNAL MEDICINE

## 2025-07-18 RX ORDER — ACETAMINOPHEN 650 MG
2 TABLETS AS NEEDED TABLET, EXTENDED RELEASE ORAL
OUTPATIENT
Start: 2025-07-18

## 2025-07-18 RX ORDER — MESALAMINE 1.2 G/1
TAKE 4 TABLETS BY MOUTH ONCE DAILY WITH A MEAL TABLET, DELAYED RELEASE ORAL ONCE A DAY
Qty: 360 | Refills: 3 | Status: ON HOLD | COMMUNITY

## 2025-07-18 RX ORDER — ACETAMINOPHEN 650 MG
2 TABLETS AS NEEDED TABLET, EXTENDED RELEASE ORAL
Status: ACTIVE | COMMUNITY
Start: 2025-07-15

## 2025-07-18 RX ORDER — VEDOLIZUMAB 300 MG/5ML
INFUSE 300 MG OVER 30 MTS AT WEEK 0 , WEEK 2 AND WEEK 6 AND THEN Q 8 WEEKS INJECTION, POWDER, LYOPHILIZED, FOR SOLUTION INTRAVENOUS ONCE A DAY
Qty: 2 | Refills: 4 | Status: ON HOLD | COMMUNITY

## 2025-07-18 RX ORDER — MESALAMINE 4 G/60ML
60 ML AT BEDTIME ENEMA RECTAL ONCE A DAY
Qty: 5400 ML | Refills: 0 | Status: ON HOLD | COMMUNITY

## 2025-07-18 RX ORDER — SODIUM, POTASSIUM,MAG SULFATES 17.5-3.13G
177 ML DAY 1 AND 177 ML DAY 2 SOLUTION, RECONSTITUTED, ORAL ORAL
Qty: 354 MILLILITER | Status: ON HOLD | COMMUNITY

## 2025-07-18 RX ORDER — ESCITALOPRAM OXALATE 5 MG
TAKE 1 TABLET (5 MG) BY ORAL ROUTE ONCE DAILY TABLET ORAL 1
Qty: 0 | Refills: 0 | Status: ON HOLD | COMMUNITY
Start: 1900-01-01

## 2025-07-18 RX ORDER — FAMOTIDINE 40 MG/1
1 TABLET AT BEDTIME TABLET, FILM COATED ORAL ONCE A DAY
Qty: 90 TABLET | Refills: 1 | Status: ACTIVE | COMMUNITY

## 2025-07-18 RX ORDER — HYDROCORTISONE SODIUM SUCCINATE 100 MG/2ML
AS DIRECTED INJECTION, POWDER, FOR SOLUTION INTRAMUSCULAR; INTRAVENOUS
Status: ACTIVE | COMMUNITY
Start: 2025-07-15

## 2025-07-18 RX ORDER — HYDROCORTISONE SODIUM SUCCINATE 100 MG/2ML
AS DIRECTED INJECTION, POWDER, FOR SOLUTION INTRAMUSCULAR; INTRAVENOUS
OUTPATIENT
Start: 2025-07-18

## 2025-07-18 RX ORDER — MESALAMINE 1.2 G/1
2 TABLETS TABLET, DELAYED RELEASE ORAL ONCE A DAY
Qty: 180 TABLET | Refills: 3 | Status: ON HOLD | COMMUNITY

## 2025-07-18 RX ORDER — SODIUM PICOSULFATE, MAGNESIUM OXIDE, AND ANHYDROUS CITRIC ACID 10; 3.5; 12 MG/160ML; G/160ML; G/160ML
160 ML DAY #1 AND 160 ML DAY # 2 LIQUID ORAL ONCE A DAY
Qty: 320 MILLIMETER | Refills: 0 | Status: ON HOLD | COMMUNITY
Start: 2020-11-11

## 2025-07-18 NOTE — HPI-TODAY'S VISIT:
Patient is seen in follow-up She has also been referred to rheumatologist who did not think her autoimmune marker was reflective of lupus.  ( but it has been rule out) Gained weight with not weight loss : Contrave adverse effect  BP is normal   From a UC standpoint she is doing well and feels her symptoms return right before she is due for her infusion.    Patient has been recommened sinus surgery  Reports her heartburn is helped by home remedies  Denies dysphagia Denies Fhx of gastroesophageal cancer     Personal hx of Proctosigmoiditis  Dx in 2005  Currently on Entyvio , alone  [facility] : BM qd  No blood in stool  No mucus     Hep B s Ag neg ( immune )     Last colonoscopy 11/8/22 : Normal exam  Last colonoscopy 11/5/2020      DATA : Does not tolerate oral Uceris or Prednisone ( hyperanxiety)

## 2025-07-29 ENCOUNTER — WEB ENCOUNTER (OUTPATIENT)
Dept: URBAN - METROPOLITAN AREA CLINIC 78 | Facility: CLINIC | Age: 45
End: 2025-07-29

## 2025-08-21 ENCOUNTER — CLAIMS CREATED FROM THE CLAIM WINDOW (OUTPATIENT)
Dept: URBAN - METROPOLITAN AREA SURGERY CENTER 15 | Facility: SURGERY CENTER | Age: 45
End: 2025-08-21
Payer: COMMERCIAL

## 2025-08-21 ENCOUNTER — CLAIMS CREATED FROM THE CLAIM WINDOW (OUTPATIENT)
Dept: URBAN - METROPOLITAN AREA SURGERY CENTER 15 | Facility: SURGERY CENTER | Age: 45
End: 2025-08-21

## 2025-08-21 DIAGNOSIS — K62.1 ANAL AND RECTAL POLYP: ICD-10-CM

## 2025-08-21 DIAGNOSIS — K63.89 OTHER SPECIFIED DISEASES OF INTESTINE: ICD-10-CM

## 2025-08-21 DIAGNOSIS — K63.5 BENIGN COLON POLYP: ICD-10-CM

## 2025-08-21 DIAGNOSIS — K51.00 ACUTE ULCERATIVE PANCOLITIS: ICD-10-CM

## 2025-08-21 DIAGNOSIS — D12.3 ADENOMA OF TRANSVERSE COLON: ICD-10-CM

## 2025-08-21 PROCEDURE — 45380 COLONOSCOPY AND BIOPSY: CPT | Performed by: INTERNAL MEDICINE

## 2025-08-21 PROCEDURE — 45385 COLONOSCOPY W/LESION REMOVAL: CPT | Performed by: INTERNAL MEDICINE
